# Patient Record
Sex: FEMALE | Race: WHITE | NOT HISPANIC OR LATINO | Employment: OTHER | URBAN - METROPOLITAN AREA
[De-identification: names, ages, dates, MRNs, and addresses within clinical notes are randomized per-mention and may not be internally consistent; named-entity substitution may affect disease eponyms.]

---

## 2024-09-07 ENCOUNTER — APPOINTMENT (OUTPATIENT)
Facility: HOSPITAL | Age: 74
End: 2024-09-07
Payer: MEDICARE

## 2024-09-07 ENCOUNTER — HOSPITAL ENCOUNTER (EMERGENCY)
Facility: HOSPITAL | Age: 74
Discharge: HOME | End: 2024-09-07
Attending: EMERGENCY MEDICINE
Payer: MEDICARE

## 2024-09-07 VITALS
BODY MASS INDEX: 32.44 KG/M2 | OXYGEN SATURATION: 94 % | SYSTOLIC BLOOD PRESSURE: 159 MMHG | RESPIRATION RATE: 16 BRPM | DIASTOLIC BLOOD PRESSURE: 88 MMHG | HEART RATE: 74 BPM | WEIGHT: 190 LBS | TEMPERATURE: 98.4 F | HEIGHT: 64 IN

## 2024-09-07 DIAGNOSIS — R10.32 LEFT LOWER QUADRANT ABDOMINAL PAIN: Primary | ICD-10-CM

## 2024-09-07 DIAGNOSIS — M48.00 SPINAL STENOSIS, UNSPECIFIED SPINAL REGION: ICD-10-CM

## 2024-09-07 LAB
ALBUMIN SERPL-MCNC: 3.9 G/DL (ref 3.5–5.2)
ALBUMIN/GLOB SERPL: 1.5 G/DL
ALP SERPL-CCNC: 64 U/L (ref 39–117)
ALT SERPL W P-5'-P-CCNC: 9 U/L (ref 1–33)
ANION GAP SERPL CALCULATED.3IONS-SCNC: 12.4 MMOL/L (ref 5–15)
AST SERPL-CCNC: 17 U/L (ref 1–32)
BACTERIA UR QL AUTO: ABNORMAL /HPF
BASOPHILS # BLD AUTO: 0.02 10*3/MM3 (ref 0–0.2)
BASOPHILS NFR BLD AUTO: 0.3 % (ref 0–1.5)
BILIRUB SERPL-MCNC: 0.5 MG/DL (ref 0–1.2)
BILIRUB UR QL STRIP: NEGATIVE
BUN SERPL-MCNC: 14 MG/DL (ref 8–23)
BUN/CREAT SERPL: 20 (ref 7–25)
CALCIUM SPEC-SCNC: 9 MG/DL (ref 8.6–10.5)
CHLORIDE SERPL-SCNC: 103 MMOL/L (ref 98–107)
CLARITY UR: CLEAR
CO2 SERPL-SCNC: 23.6 MMOL/L (ref 22–29)
COLOR UR: YELLOW
CREAT SERPL-MCNC: 0.7 MG/DL (ref 0.57–1)
D-LACTATE SERPL-SCNC: 1.4 MMOL/L (ref 0.5–2)
DEPRECATED RDW RBC AUTO: 42.2 FL (ref 37–54)
EGFRCR SERPLBLD CKD-EPI 2021: 90.9 ML/MIN/1.73
EOSINOPHIL # BLD AUTO: 0.09 10*3/MM3 (ref 0–0.4)
EOSINOPHIL NFR BLD AUTO: 1.5 % (ref 0.3–6.2)
ERYTHROCYTE [DISTWIDTH] IN BLOOD BY AUTOMATED COUNT: 12.7 % (ref 12.3–15.4)
GLOBULIN UR ELPH-MCNC: 2.6 GM/DL
GLUCOSE SERPL-MCNC: 306 MG/DL (ref 65–99)
GLUCOSE UR STRIP-MCNC: ABNORMAL MG/DL
HCT VFR BLD AUTO: 44.5 % (ref 34–46.6)
HGB BLD-MCNC: 14.9 G/DL (ref 12–15.9)
HGB UR QL STRIP.AUTO: NEGATIVE
HOLD SPECIMEN: NORMAL
HYALINE CASTS UR QL AUTO: ABNORMAL /LPF
IMM GRANULOCYTES # BLD AUTO: 0.01 10*3/MM3 (ref 0–0.05)
IMM GRANULOCYTES NFR BLD AUTO: 0.2 % (ref 0–0.5)
KETONES UR QL STRIP: NEGATIVE
LEUKOCYTE ESTERASE UR QL STRIP.AUTO: ABNORMAL
LIPASE SERPL-CCNC: 57 U/L (ref 13–60)
LYMPHOCYTES # BLD AUTO: 1.63 10*3/MM3 (ref 0.7–3.1)
LYMPHOCYTES NFR BLD AUTO: 27.1 % (ref 19.6–45.3)
MCH RBC QN AUTO: 30 PG (ref 26.6–33)
MCHC RBC AUTO-ENTMCNC: 33.5 G/DL (ref 31.5–35.7)
MCV RBC AUTO: 89.5 FL (ref 79–97)
MONOCYTES # BLD AUTO: 0.32 10*3/MM3 (ref 0.1–0.9)
MONOCYTES NFR BLD AUTO: 5.3 % (ref 5–12)
NEUTROPHILS NFR BLD AUTO: 3.95 10*3/MM3 (ref 1.7–7)
NEUTROPHILS NFR BLD AUTO: 65.6 % (ref 42.7–76)
NITRITE UR QL STRIP: NEGATIVE
PH UR STRIP.AUTO: 6 [PH] (ref 5–8)
PLATELET # BLD AUTO: 311 10*3/MM3 (ref 140–450)
PMV BLD AUTO: 10.8 FL (ref 6–12)
POTASSIUM SERPL-SCNC: 3.8 MMOL/L (ref 3.5–5.2)
PROT SERPL-MCNC: 6.5 G/DL (ref 6–8.5)
PROT UR QL STRIP: NEGATIVE
RBC # BLD AUTO: 4.97 10*6/MM3 (ref 3.77–5.28)
RBC # UR STRIP: ABNORMAL /HPF
REF LAB TEST METHOD: ABNORMAL
SODIUM SERPL-SCNC: 139 MMOL/L (ref 136–145)
SP GR UR STRIP: 1.02 (ref 1–1.03)
SQUAMOUS #/AREA URNS HPF: ABNORMAL /HPF
UROBILINOGEN UR QL STRIP: ABNORMAL
WBC # UR STRIP: ABNORMAL /HPF
WBC NRBC COR # BLD AUTO: 6.02 10*3/MM3 (ref 3.4–10.8)
WHOLE BLOOD HOLD COAG: NORMAL
WHOLE BLOOD HOLD SPECIMEN: NORMAL
YEAST URNS QL MICRO: ABNORMAL /HPF

## 2024-09-07 PROCEDURE — 81015 MICROSCOPIC EXAM OF URINE: CPT | Performed by: EMERGENCY MEDICINE

## 2024-09-07 PROCEDURE — 85025 COMPLETE CBC W/AUTO DIFF WBC: CPT | Performed by: EMERGENCY MEDICINE

## 2024-09-07 PROCEDURE — 99285 EMERGENCY DEPT VISIT HI MDM: CPT

## 2024-09-07 PROCEDURE — 83605 ASSAY OF LACTIC ACID: CPT | Performed by: EMERGENCY MEDICINE

## 2024-09-07 PROCEDURE — 96374 THER/PROPH/DIAG INJ IV PUSH: CPT

## 2024-09-07 PROCEDURE — 25010000002 MORPHINE PER 10 MG: Performed by: EMERGENCY MEDICINE

## 2024-09-07 PROCEDURE — 80053 COMPREHEN METABOLIC PANEL: CPT | Performed by: EMERGENCY MEDICINE

## 2024-09-07 PROCEDURE — 25510000001 IOPAMIDOL 61 % SOLUTION: Performed by: EMERGENCY MEDICINE

## 2024-09-07 PROCEDURE — 74177 CT ABD & PELVIS W/CONTRAST: CPT

## 2024-09-07 PROCEDURE — 81003 URINALYSIS AUTO W/O SCOPE: CPT | Performed by: EMERGENCY MEDICINE

## 2024-09-07 PROCEDURE — 83690 ASSAY OF LIPASE: CPT | Performed by: EMERGENCY MEDICINE

## 2024-09-07 RX ORDER — IOPAMIDOL 612 MG/ML
100 INJECTION, SOLUTION INTRAVASCULAR
Status: COMPLETED | OUTPATIENT
Start: 2024-09-07 | End: 2024-09-07

## 2024-09-07 RX ORDER — SODIUM CHLORIDE 9 MG/ML
10 INJECTION, SOLUTION INTRAMUSCULAR; INTRAVENOUS; SUBCUTANEOUS AS NEEDED
Status: DISCONTINUED | OUTPATIENT
Start: 2024-09-07 | End: 2024-09-07 | Stop reason: HOSPADM

## 2024-09-07 RX ADMIN — IOPAMIDOL 90 ML: 612 INJECTION, SOLUTION INTRAVENOUS at 14:16

## 2024-09-07 RX ADMIN — MORPHINE SULFATE 4 MG: 4 INJECTION, SOLUTION INTRAMUSCULAR; INTRAVENOUS at 16:12

## 2024-09-07 NOTE — FSED PROVIDER NOTE
"Subjective  History of Present Illness:    This is a 74-year-old female who presents with complaints of left lower quadrant abdominal pain that started yesterday.  Patient describes the pain as a burning sensation.  She has not had any diarrhea, has had normal bowel movements for her.  Denies any vomiting.  No fevers.  Typically does have constipation.  Does endorse abdominal bloating does not take any medications for her symptoms.  Past abdominal surgical history includes hysterectomy and ovarian cyst rupture.  Has had colonoscopy in the past with polyp removal.      Nurses Notes reviewed and agree, including vitals, allergies, social history and prior medical history.     REVIEW OF SYSTEMS: All systems reviewed and not pertinent unless noted.  Review of Systems    Past Medical History:   Diagnosis Date    Arthritis     Diabetes mellitus     Spinal stenosis        Allergies:    Patient has no known allergies.      Past Surgical History:   Procedure Laterality Date    ANKLE ARTHROPLASTY      BACK SURGERY      HYSTERECTOMY      SPINE SURGERY      TONSILLECTOMY           Social History     Socioeconomic History    Marital status:          History reviewed. No pertinent family history.    Objective  Physical Exam:  BP (!) 190/84   Pulse 77   Temp 98.4 °F (36.9 °C) (Oral)   Resp 16   Ht 162.6 cm (64\")   Wt 86.2 kg (190 lb)   SpO2 100%   BMI 32.61 kg/m²      Physical Exam  Vitals and nursing note reviewed.   Constitutional:       Appearance: She is well-developed.   Cardiovascular:      Rate and Rhythm: Normal rate and regular rhythm.   Pulmonary:      Effort: Pulmonary effort is normal.      Breath sounds: Normal breath sounds.   Abdominal:      General: Abdomen is flat.      Palpations: Abdomen is soft.      Tenderness: There is abdominal tenderness in the left lower quadrant.   Skin:     General: Skin is warm and dry.   Neurological:      Mental Status: She is alert.         Procedures    ED Course:     "     Lab Results (last 24 hours)       Procedure Component Value Units Date/Time    CBC & Differential [[id number]]  (Normal) Collected: 09/07/24 1220    Specimen: Blood Updated: 09/07/24 1257    Narrative:      The following orders were created for panel order CBC & Differential.  Procedure                               Abnormality         Status                     ---------                               -----------         ------                     CBC Auto Differential[[id number]]        Normal              Final result                 Please view results for these tests on the individual orders.    Comprehensive Metabolic Panel [[id number]]  (Abnormal) Collected: 09/07/24 1220    Specimen: Blood Updated: 09/07/24 1317     Glucose 306 mg/dL      BUN 14 mg/dL      Creatinine 0.70 mg/dL      Sodium 139 mmol/L      Potassium 3.8 mmol/L      Chloride 103 mmol/L      CO2 23.6 mmol/L      Calcium 9.0 mg/dL      Total Protein 6.5 g/dL      Albumin 3.9 g/dL      ALT (SGPT) 9 U/L      AST (SGOT) 17 U/L      Alkaline Phosphatase 64 U/L      Total Bilirubin 0.5 mg/dL      Globulin 2.6 gm/dL      A/G Ratio 1.5 g/dL      BUN/Creatinine Ratio 20.0     Anion Gap 12.4 mmol/L      eGFR 90.9 mL/min/1.73     Narrative:      GFR Normal >60  Chronic Kidney Disease <60  Kidney Failure <15    The GFR formula is only valid for adults with stable renal function between ages 18 and 70.    Lipase [[id number]]  (Normal) Collected: 09/07/24 1220    Specimen: Blood Updated: 09/07/24 1316     Lipase 57 U/L     Lactic Acid, Plasma [[id number]]  (Normal) Collected: 09/07/24 1220    Specimen: Blood Updated: 09/07/24 1315     Lactate 1.4 mmol/L     CBC Auto Differential [[id number]]  (Normal) Collected: 09/07/24 1220    Specimen: Blood Updated: 09/07/24 1257     WBC 6.02 10*3/mm3      RBC 4.97 10*6/mm3      Hemoglobin 14.9 g/dL      Hematocrit 44.5 %      MCV 89.5 fL      MCH 30.0 pg      MCHC 33.5 g/dL      RDW 12.7 %      RDW-SD 42.2 fl      MPV 10.8  fL      Platelets 311 10*3/mm3      Neutrophil % 65.6 %      Lymphocyte % 27.1 %      Monocyte % 5.3 %      Eosinophil % 1.5 %      Basophil % 0.3 %      Immature Grans % 0.2 %      Neutrophils, Absolute 3.95 10*3/mm3      Lymphocytes, Absolute 1.63 10*3/mm3      Monocytes, Absolute 0.32 10*3/mm3      Eosinophils, Absolute 0.09 10*3/mm3      Basophils, Absolute 0.02 10*3/mm3      Immature Grans, Absolute 0.01 10*3/mm3     Urinalysis With Microscopic If Indicated (No Culture) - Urine, Clean Catch [[id number]]  (Abnormal) Collected: 09/07/24 1241    Specimen: Urine, Clean Catch Updated: 09/07/24 1257     Color, UA Yellow     Appearance, UA Clear     pH, UA 6.0     Specific Gravity, UA 1.025     Glucose,  mg/dL (2+)     Ketones, UA Negative     Bilirubin, UA Negative     Blood, UA Negative     Protein, UA Negative     Leuk Esterase, UA Small (1+)     Nitrite, UA Negative     Urobilinogen, UA 1.0 E.U./dL    Urinalysis, Microscopic Only - Urine, Clean Catch [[id number]]  (Abnormal) Collected: 09/07/24 1558    Specimen: Urine, Clean Catch Updated: 09/07/24 1610     RBC, UA 0-2 /HPF      WBC, UA 3-5 /HPF      Bacteria, UA None Seen /HPF      Squamous Epithelial Cells, UA 0-2 /HPF      Yeast, UA Small/1+ Budding Yeast /HPF      Hyaline Casts, UA None Seen /LPF      Methodology Manual Light Microscopy             CT Abdomen Pelvis With Contrast    Result Date: 9/7/2024  CT ABDOMEN PELVIS W CONTRAST Date of Exam: 9/7/2024 2:08 PM EDT Indication: LLQ abdominal pain. Comparison: None available. Technique: Axial CT images were obtained of the abdomen and pelvis following the uneventful intravenous administration of 90 cc Isovue-300 IV contrast . Reconstructed coronal and sagittal images were also obtained. Automated exposure control and iterative construction methods were used. FINDINGS: Lung bases: Continued elevation of the left hemidiaphragm. Liver:No masses. No intrahepatic biliary ductal dilatation. Spleen:No masses. No  perisplenic hematoma. Pancreas:No pancreatic masses. No evidence of pancreatitis. Gallbladder and common bile duct:No evidence of cholelithiasis. No evidence of cholecystitis. Adrenal glands:No adrenal masses Kidneys and ureters: Hypodensities involving both kidneys statistically likely represent renal cysts. These measure up to 1.8 cm on the right and 0.8 cm on the left. No calculi present within the ureters. Normal caliber ureters. Urinary bladder:No urinary bladder wall thickening. No bladder masses. Small bowel:Normal caliber small bowel. Large bowel:No diverticulosis or diverticulitis. No large bowel masses are appreciated Appendix: Prior appendectomy GENITOURINARY: Prior hysterectomy. Ascites or pneumoperitoneum:None. Adenopathy:None present Osseous structures: The proximal femurs are intact. Degenerative changes of the hips and lumbar spine with prior lumbar spine fusion. Old healed right rib fractures. Remote unhealed fracture of the left 11th and 10th ribs. Other findings: None     Impression: No acute abdominal or pelvic pathology. Electronically Signed: Zhang Lackey MD  9/7/2024 2:29 PM EDT  Workstation ID: [id number]        [facility]     Amount and/or Complexity of Data Reviewed  Clinical lab tests: reviewed  Tests in the radiology section of CPT®: reviewed  Tests in the medicine section of CPT®: reviewed        Initial impression of presenting illness: Patient presents with complaints of left lower quadrant abdominal pain.  Workup did not reveal any acute findings.  White blood cell count was normal.  CT scan not really any acute findings.  Patient may have a small lipoma superficially.  Patient also has significant history of spinal stenosis and nerve damage.  She would like referrals to pain management and needs referrals for gastroenterology for colonoscopy.  Patient also would like a referral to primary care.  Discussed return precautions.  Discussed findings and plan of care with the patient who is  agreeable to discharge.    DDX: includes but is not limited to: Diverticulitis, gastroenteritis, colitis, urinary tract infection      Medications   Sodium Chloride (PF) 0.9 % 10 mL (has no administration in time range)   iopamidol (ISOVUE-300) 61 % injection 100 mL (90 mL Intravenous Given 9/7/24 1416)   morphine injection 4 mg (4 mg Intravenous Given 9/7/24 1612)           Data interpreted: Nursing notes reviewed, vital signs reviewed.  Labs independently interpreted by me (CBC, CMP, lipase, UA, troponin, ABG, lactic acid, procalcitonin).  Imaging independently interpreted by me (x-ray, CT scan).  EKG independently interpreted by me.  O2 saturation:    Counseling: Discussed the results above with the patient regarding need for admission or discharge.  Patient understands and agrees plan of care.      -----  ED Disposition       ED Disposition   Discharge    Condition   Stable    Comment   --             Final diagnoses:   Left lower quadrant abdominal pain   Spinal stenosis, unspecified spinal region      Your Follow-Up Providers       Go to  [facility] EMERGENCY DEPARTMENT [place].    Specialty: Emergency Medicine  Follow up details: As needed, If symptoms worsen  [address]  [facility] [address] [phone]             Schedule an appointment as soon as possible for a visit  with Da Gutierrez MD.    Specialty: Family Medicine  [address] [facility] [address]  [place] [address]  [phone]               [facility] .    Specialty: Pain Medicine  [address]  [facility] [address] [phone]  Additional information:  Phone Number: [phone]                     Contact information for after-discharge care    Follow-up information has not been specified.                    Your medication list        START taking these medications        Instructions Last Dose Given Next Dose Due   naproxen 375 MG  tablet  Commonly known as: NAPROSYN      Take 1 tablet by mouth 2 (Two) Times a Day As Needed for Mild Pain.                 Where to Get Your Medications        These medications were sent to [facility] [id number] - [place], [place] - [address] AT [facility] - [phone]  - [phone] FX  [address] [facility] [address]      Phone: [phone]   naproxen 375 MG tablet

## 2024-09-10 ENCOUNTER — OFFICE VISIT (OUTPATIENT)
Dept: FAMILY MEDICINE CLINIC | Facility: CLINIC | Age: 74
End: 2024-09-10
Payer: MEDICARE

## 2024-09-10 VITALS
HEART RATE: 79 BPM | RESPIRATION RATE: 16 BRPM | TEMPERATURE: 97.5 F | HEIGHT: 64 IN | WEIGHT: 189.2 LBS | BODY MASS INDEX: 32.3 KG/M2 | DIASTOLIC BLOOD PRESSURE: 70 MMHG | OXYGEN SATURATION: 97 % | SYSTOLIC BLOOD PRESSURE: 138 MMHG

## 2024-09-10 DIAGNOSIS — E11.65 TYPE 2 DIABETES MELLITUS WITH HYPERGLYCEMIA, WITH LONG-TERM CURRENT USE OF INSULIN: Primary | ICD-10-CM

## 2024-09-10 DIAGNOSIS — R10.32 LLQ ABDOMINAL PAIN: ICD-10-CM

## 2024-09-10 DIAGNOSIS — Z79.4 TYPE 2 DIABETES MELLITUS WITH HYPERGLYCEMIA, WITH LONG-TERM CURRENT USE OF INSULIN: Primary | ICD-10-CM

## 2024-09-10 LAB
EXPIRATION DATE: NORMAL
Lab: NORMAL
POC CREATININE URINE: NORMAL
POC MICROALBUMIN URINE: NORMAL

## 2024-09-10 PROCEDURE — 3046F HEMOGLOBIN A1C LEVEL >9.0%: CPT | Performed by: NURSE PRACTITIONER

## 2024-09-10 PROCEDURE — 82044 UR ALBUMIN SEMIQUANTITATIVE: CPT | Performed by: NURSE PRACTITIONER

## 2024-09-10 PROCEDURE — 99204 OFFICE O/P NEW MOD 45 MIN: CPT | Performed by: NURSE PRACTITIONER

## 2024-09-10 PROCEDURE — 1160F RVW MEDS BY RX/DR IN RCRD: CPT | Performed by: NURSE PRACTITIONER

## 2024-09-10 PROCEDURE — 1159F MED LIST DOCD IN RCRD: CPT | Performed by: NURSE PRACTITIONER

## 2024-09-10 RX ORDER — SEMAGLUTIDE 1.34 MG/ML
1 INJECTION, SOLUTION SUBCUTANEOUS WEEKLY
COMMUNITY
Start: 2024-07-02 | End: 2024-09-18

## 2024-09-11 LAB
ALBUMIN SERPL-MCNC: 3.9 G/DL (ref 3.8–4.8)
ALP SERPL-CCNC: 65 IU/L (ref 44–121)
ALT SERPL-CCNC: 13 IU/L (ref 0–32)
AST SERPL-CCNC: 15 IU/L (ref 0–40)
BASOPHILS # BLD AUTO: 0 X10E3/UL (ref 0–0.2)
BASOPHILS NFR BLD AUTO: 0 %
BILIRUB SERPL-MCNC: 0.4 MG/DL (ref 0–1.2)
BUN SERPL-MCNC: 19 MG/DL (ref 8–27)
BUN/CREAT SERPL: 20 (ref 12–28)
CALCIUM SERPL-MCNC: 9.2 MG/DL (ref 8.7–10.3)
CHLORIDE SERPL-SCNC: 102 MMOL/L (ref 96–106)
CO2 SERPL-SCNC: 22 MMOL/L (ref 20–29)
CREAT SERPL-MCNC: 0.95 MG/DL (ref 0.57–1)
EGFRCR SERPLBLD CKD-EPI 2021: 63 ML/MIN/1.73
EOSINOPHIL # BLD AUTO: 0.1 X10E3/UL (ref 0–0.4)
EOSINOPHIL NFR BLD AUTO: 2 %
ERYTHROCYTE [DISTWIDTH] IN BLOOD BY AUTOMATED COUNT: 11.9 % (ref 11.7–15.4)
GLOBULIN SER CALC-MCNC: 2.4 G/DL (ref 1.5–4.5)
GLUCOSE SERPL-MCNC: 304 MG/DL (ref 70–99)
HBA1C MFR BLD: 10.1 % (ref 4.8–5.6)
HCT VFR BLD AUTO: 44.4 % (ref 34–46.6)
HGB BLD-MCNC: 14.4 G/DL (ref 11.1–15.9)
IMM GRANULOCYTES # BLD AUTO: 0 X10E3/UL (ref 0–0.1)
IMM GRANULOCYTES NFR BLD AUTO: 0 %
LYMPHOCYTES # BLD AUTO: 1.5 X10E3/UL (ref 0.7–3.1)
LYMPHOCYTES NFR BLD AUTO: 21 %
MCH RBC QN AUTO: 30.8 PG (ref 26.6–33)
MCHC RBC AUTO-ENTMCNC: 32.4 G/DL (ref 31.5–35.7)
MCV RBC AUTO: 95 FL (ref 79–97)
MONOCYTES # BLD AUTO: 0.4 X10E3/UL (ref 0.1–0.9)
MONOCYTES NFR BLD AUTO: 5 %
NEUTROPHILS # BLD AUTO: 5.2 X10E3/UL (ref 1.4–7)
NEUTROPHILS NFR BLD AUTO: 72 %
PLATELET # BLD AUTO: 324 X10E3/UL (ref 150–450)
POTASSIUM SERPL-SCNC: 4.3 MMOL/L (ref 3.5–5.2)
PROT SERPL-MCNC: 6.3 G/DL (ref 6–8.5)
RBC # BLD AUTO: 4.68 X10E6/UL (ref 3.77–5.28)
SODIUM SERPL-SCNC: 137 MMOL/L (ref 134–144)
WBC # BLD AUTO: 7.3 X10E3/UL (ref 3.4–10.8)

## 2024-09-17 ENCOUNTER — HOSPITAL ENCOUNTER (OUTPATIENT)
Dept: ULTRASOUND IMAGING | Facility: HOSPITAL | Age: 74
Discharge: HOME | End: 2024-09-17
Admitting: NURSE PRACTITIONER
Payer: MEDICARE

## 2024-09-17 DIAGNOSIS — R10.32 LLQ ABDOMINAL PAIN: ICD-10-CM

## 2024-09-17 PROCEDURE — 76700 US EXAM ABDOM COMPLETE: CPT

## 2024-09-18 ENCOUNTER — OFFICE VISIT (OUTPATIENT)
Dept: FAMILY MEDICINE CLINIC | Facility: CLINIC | Age: 74
End: 2024-09-18
Payer: MEDICARE

## 2024-09-18 VITALS
HEART RATE: 80 BPM | DIASTOLIC BLOOD PRESSURE: 78 MMHG | HEIGHT: 64 IN | TEMPERATURE: 97.5 F | RESPIRATION RATE: 16 BRPM | SYSTOLIC BLOOD PRESSURE: 138 MMHG | OXYGEN SATURATION: 96 % | WEIGHT: 188.4 LBS | BODY MASS INDEX: 32.17 KG/M2

## 2024-09-18 DIAGNOSIS — E11.65 TYPE 2 DIABETES MELLITUS WITH HYPERGLYCEMIA, WITH LONG-TERM CURRENT USE OF INSULIN: ICD-10-CM

## 2024-09-18 DIAGNOSIS — Z79.4 TYPE 2 DIABETES MELLITUS WITH HYPERGLYCEMIA, WITH LONG-TERM CURRENT USE OF INSULIN: ICD-10-CM

## 2024-09-18 DIAGNOSIS — K80.20 CALCULUS OF GALLBLADDER WITHOUT CHOLECYSTITIS WITHOUT OBSTRUCTION: Primary | ICD-10-CM

## 2024-09-18 PROCEDURE — 99204 OFFICE O/P NEW MOD 45 MIN: CPT | Performed by: NURSE PRACTITIONER

## 2024-09-18 PROCEDURE — 3046F HEMOGLOBIN A1C LEVEL >9.0%: CPT | Performed by: NURSE PRACTITIONER

## 2024-09-18 PROCEDURE — 1159F MED LIST DOCD IN RCRD: CPT | Performed by: NURSE PRACTITIONER

## 2024-09-18 PROCEDURE — 1160F RVW MEDS BY RX/DR IN RCRD: CPT | Performed by: NURSE PRACTITIONER

## 2024-09-18 RX ORDER — SEMAGLUTIDE 0.68 MG/ML
0.25 INJECTION, SOLUTION SUBCUTANEOUS WEEKLY
Qty: 3 ML | Refills: 0 | COMMUNITY
Start: 2024-09-18

## 2024-09-22 ENCOUNTER — PATIENT ROUNDING (BHMG ONLY) (OUTPATIENT)
Dept: FAMILY MEDICINE CLINIC | Facility: CLINIC | Age: 74
End: 2024-09-22
Payer: MEDICARE

## 2024-10-02 ENCOUNTER — OFFICE VISIT (OUTPATIENT)
Dept: PAIN MEDICINE | Facility: CLINIC | Age: 74
End: 2024-10-02
Payer: MEDICARE

## 2024-10-02 VITALS — HEIGHT: 64 IN | BODY MASS INDEX: 30.05 KG/M2 | WEIGHT: 176 LBS

## 2024-10-02 DIAGNOSIS — M96.1 POSTLAMINECTOMY SYNDROME: Primary | ICD-10-CM

## 2024-10-02 PROCEDURE — 1125F AMNT PAIN NOTED PAIN PRSNT: CPT | Performed by: STUDENT IN AN ORGANIZED HEALTH CARE EDUCATION/TRAINING PROGRAM

## 2024-10-02 PROCEDURE — 99204 OFFICE O/P NEW MOD 45 MIN: CPT | Performed by: STUDENT IN AN ORGANIZED HEALTH CARE EDUCATION/TRAINING PROGRAM

## 2024-10-02 PROCEDURE — 1160F RVW MEDS BY RX/DR IN RCRD: CPT | Performed by: STUDENT IN AN ORGANIZED HEALTH CARE EDUCATION/TRAINING PROGRAM

## 2024-10-02 PROCEDURE — 1159F MED LIST DOCD IN RCRD: CPT | Performed by: STUDENT IN AN ORGANIZED HEALTH CARE EDUCATION/TRAINING PROGRAM

## 2024-10-02 PROCEDURE — G2211 COMPLEX E/M VISIT ADD ON: HCPCS | Performed by: STUDENT IN AN ORGANIZED HEALTH CARE EDUCATION/TRAINING PROGRAM

## 2024-10-02 RX ORDER — GABAPENTIN 100 MG/1
100 CAPSULE ORAL 2 TIMES DAILY
Qty: 60 CAPSULE | Refills: 0 | Status: SHIPPED | OUTPATIENT
Start: 2024-10-02

## 2024-10-02 NOTE — PROGRESS NOTES
Referring Physician: Jessi Urrutia PA-C  [address] [facility] [address]  [place] [address]    Primary Physician: Sonia Flynn APRN    CHIEF COMPLAINT or REASON FOR VISIT: Back Pain (New patient)      Initial history of present illness on 10/02/2024:  Ms. Gera Dominguez is 74 y.o. female who presents as a new patient referral for evaluation treatment of chronic low back pain with radiation to left lower extremity.  She describes numbness burning tingling pain radiating into her left leg and foot.  She has a prior history of cervical lumbar spinal fusion with Dr. Malloy, neurosurgery.  Subsequent to the surgery she continues to have numbness and burning radiating to her left leg.  She did try gabapentin 100 mg which was helpful however she was concerned about needing to continue to take medicine to treat her condition.  She would like to avoid medications.  She has previously seen Dr. Clemente at the [facility] with pain management.  She has tried injection therapy with modest benefit.  Dr. Clemente had discussed spinal cord stimulator trial and prepared accordingly with thoracic and lumbar MRI.  Unfortunately her blood glucose is poorly controlled with most recent A1c of 10.1%.  She is now moving all of her care to [facility].    Interval history:    Interventions:      Objective Pain Scoring:   BRIEF PAIN INVENTORY:  Total score:   Pain Score    10/02/24 1250   PainSc:   8   PainLoc: Leg      PHQ-2: PHQ-2 Total Score: 0  PHQ-9: PHQ-9: Brief Depression Severity Measure Score: 0  Opioid Risk Tool:         Review of Systems:   ROS negative except as otherwise noted     Past Medical History:   Past Medical History:   Diagnosis Date    Arthritis     Diabetes mellitus     Spinal stenosis          Past Surgical History:   Past Surgical History:   Procedure Laterality Date    ANKLE ARTHROPLASTY      BACK SURGERY      HYSTERECTOMY      SPINE SURGERY      TONSILLECTOMY           Family History   History  "reviewed. No pertinent family history.      Social History   Social History     Socioeconomic History    Marital status:    Tobacco Use    Smoking status: Never     Passive exposure: Never    Smokeless tobacco: Never   Vaping Use    Vaping status: Never Used   Substance and Sexual Activity    Alcohol use: Not Currently    Drug use: Never    Sexual activity: Defer        Medications:     Current Outpatient Medications:     insulin degludec (TRESIBA FLEXTOUCH) 100 UNIT/ML solution pen-injector injection, Inject 24 Units under the skin into the appropriate area as directed Daily., Disp: , Rfl:     naproxen (NAPROSYN) 375 MG tablet, Take 1 tablet by mouth 2 (Two) Times a Day As Needed for Mild Pain., Disp: 30 tablet, Rfl: 0    Semaglutide,0.25 or 0.5MG/DOS, (Ozempic, 0.25 or 0.5 MG/DOSE,) 2 MG/3ML solution pen-injector, Inject 0.25 mg under the skin into the appropriate area as directed 1 (One) Time Per Week., Disp: 3 mL, Rfl: 0    gabapentin (Neurontin) 100 MG capsule, Take 1 capsule by mouth 2 (Two) Times a Day., Disp: 60 capsule, Rfl: 0        Physical Exam:     Vitals:    10/02/24 1250   Weight: 79.8 kg (176 lb)   Height: 162.6 cm (64\")   PainSc:   8   PainLoc: Leg        General: Alert and oriented, No acute distress.   HEENT: Normocephalic, atraumatic.   Cardiovascular: No gross edema  Respiratory: Respirations are non-labored       Lumbar Spine:   No masses or atrophy  Range of motion - Flexion normal. Extension normal.    Facet Loading: Negative bilaterally  Facet Palpation - Nontender   Asif finger/Gaenslen's/Amari's/ESTEFANIA/Thigh thrust -   Straight leg raise/slump test: Negative bilaterally  Multifidus toe-touch test:    Motor Exam:       Strength: Rate on 1-5 scale Right Left    L1/2- hip flexion 5/5  5/5    L3- knee extension 5/5  5/5    L4- ankle dorsiflexion 5/5  5/5    L5- great toe extension 5/5  5/5    S1- ankle plantarflexion 5/5  5/5    Sensory Exam: Burning paresthesia left leg   "     Neurologic: Cranial Nerves II-XII are grossly intact.      Psychiatric: Cooperative.   Gait: Antalgic  Assistive Devices: None    Imaging Studies:   No results found for this or any previous visit.        Independent review of radiographic imaging:     Impression & Plan:       10/02/2024: Gera Dominguez is a 74 y.o. female with past medical history significant for L2-4 laminectomies, L2-5 PLIF, possible cervical discectomy, DM who presents to the pain clinic for evaluation and treatment of chronic low back radiation left lower extremity.  Evaluation consistent with postlaminectomy syndrome.  I will refer her to endocrinology for better control of her glucose.  She additionally will start gabapentin.  She may be a candidate for spinal cord stimulator trial once her HbA1c is consistently at least below 10% preferably below 8%.    1. Postlaminectomy syndrome        PLAN:  1. Medication Recommendations: Recommend Voltaren topical, NSAIDs, Tylenol.  Can trial turmeric 500 mg twice daily if NSAID contraindicated.  -Start gabapentin 100 mg twice daily 60 pills no refills  -As part of this patient's treatment plan, patient will be prescribed controlled substances. The patient has been made aware of appropriate use of such medications, including potential risk of somnolence, limited ability to drive and /or work safely, and potential for dependence or overdose. It has also been made clear that these medications are for use by this patient only, without concomitant use of alcohol or other substances unless prescribed.Controlled substance status of medication discussed with patient, discussed risks of medication including abuse potential and diversion potential and need to follow up for reevaluation appointment in order to receive further refills.  Tyler was reviewed and compliant.    2. Physical Therapy: Continue HEP    3. Psychological: defer    4. Complementary and alternative (CAM) Therapies:     5. Labs/Diagnostic  studies: None indicated     6. Imaging: Thoracic and lumbar MRIs reviewed    7. Interventions: Consider SCS trial after A1c consistently below 10% preferably below 8%    8. Referrals: Dr. Carl Rosenstein endocrinology    9. Records: Outside pain management, neurosurgery, PCP notes reviewed    10. Lifestyle goals:    Follow-up 1 month      [facility]  Bobby Quinones MD          Quality Metrics:

## 2024-10-03 DIAGNOSIS — Z79.4 TYPE 2 DIABETES MELLITUS WITH HYPERGLYCEMIA, WITH LONG-TERM CURRENT USE OF INSULIN: Primary | ICD-10-CM

## 2024-10-03 DIAGNOSIS — E11.65 TYPE 2 DIABETES MELLITUS WITH HYPERGLYCEMIA, WITH LONG-TERM CURRENT USE OF INSULIN: Primary | ICD-10-CM

## 2024-10-23 ENCOUNTER — TELEPHONE (OUTPATIENT)
Dept: FAMILY MEDICINE CLINIC | Facility: CLINIC | Age: 74
End: 2024-10-23
Payer: MEDICARE

## 2024-10-23 NOTE — TELEPHONE ENCOUNTER
Caller: Gera Dominguez    Relationship: Self    Best call back number:[phone]    What is the best time to reach you: ANYTIME     Who are you requesting to speak with (clinical staff, provider,  specific staff member): CLINICAL STAFF      What was the call regarding: THE PATIENT WOULD LIKE TO KNOW IF THE OFFICE HAS THE MODERNA RSV VACCINE AND IF SHE CAN GET THE VACCINE

## 2024-10-24 ENCOUNTER — FLU SHOT (OUTPATIENT)
Dept: FAMILY MEDICINE CLINIC | Facility: CLINIC | Age: 74
End: 2024-10-24
Payer: MEDICARE

## 2024-10-24 DIAGNOSIS — Z23 NEED FOR INFLUENZA VACCINATION: Primary | ICD-10-CM

## 2024-10-24 PROCEDURE — G0008 ADMIN INFLUENZA VIRUS VAC: HCPCS | Performed by: NURSE PRACTITIONER

## 2024-10-24 PROCEDURE — 90662 IIV NO PRSV INCREASED AG IM: CPT | Performed by: NURSE PRACTITIONER

## 2024-10-28 ENCOUNTER — TELEPHONE (OUTPATIENT)
Dept: FAMILY MEDICINE CLINIC | Facility: CLINIC | Age: 74
End: 2024-10-28
Payer: MEDICARE

## 2024-10-28 DIAGNOSIS — Z91.89 AT HIGH RISK FOR RESPIRATORY INFECTION: Primary | ICD-10-CM

## 2024-10-28 NOTE — TELEPHONE ENCOUNTER
Pt educated that it's not that we refuse it, we don't carry it. She understood. Rx faxed to [facility] at pts request.

## 2024-10-28 NOTE — TELEPHONE ENCOUNTER
Caller: Gera Dominguez    Relationship: Self    Best call back number: [phone]     What was the call regarding: PATIENT WOULD LIKE TO DISCUSS GETTING THE RSV VACCINE. SHE'S AWARE THAT OUR OFFICE REFUSES TO DO IT. HOWEVER, HER PHARMACY WILL GIVE HER THE SHOT WITH A PRESCRIPTION. PLEASE FOLLOW UP.    Is it okay if the provider responds through MyChart: NO

## 2024-10-28 NOTE — TELEPHONE ENCOUNTER
I have sent order to the pharmacy for patient.    Please let patient know that we do not refuse to do the vaccine, we just do not carry it in the office.    Thanks

## 2024-10-29 ENCOUNTER — TELEPHONE (OUTPATIENT)
Dept: FAMILY MEDICINE CLINIC | Facility: CLINIC | Age: 74
End: 2024-10-29
Payer: MEDICARE

## 2024-10-29 DIAGNOSIS — Z91.89 AT HIGH RISK FOR RESPIRATORY INFECTION: Primary | ICD-10-CM

## 2024-10-29 RX ORDER — RESPIRATORY SYNCYTIAL VIRUS VACCINE 50 UG/.5ML
1 SUSPENSION INTRAMUSCULAR ONCE
Qty: 0.5 ML | Refills: 0 | Status: SHIPPED | OUTPATIENT
Start: 2024-10-29 | End: 2024-10-29

## 2024-10-29 NOTE — TELEPHONE ENCOUNTER
Caller: Gera Dominguez    Relationship: Self    Best call back number: [phone]     What was the call regarding: PATIENT STATES THE WRONG RSV VACCINE WAS CALLED IN FOR HER PREVIOUSLY AND WOULD LIKE TO HAVE THE MRESVIA  RSV VACCINE [id number] - [place], [place] - [address] [place] [address] AT [facility] [address] - [phone] [facility] [phone] FX

## 2024-10-30 ENCOUNTER — OFFICE VISIT (OUTPATIENT)
Dept: PAIN MEDICINE | Facility: CLINIC | Age: 74
End: 2024-10-30
Payer: MEDICARE

## 2024-10-30 VITALS — WEIGHT: 190 LBS | HEIGHT: 64 IN | BODY MASS INDEX: 32.44 KG/M2

## 2024-10-30 DIAGNOSIS — M96.1 POSTLAMINECTOMY SYNDROME: Primary | ICD-10-CM

## 2024-10-30 DIAGNOSIS — E11.65 TYPE 2 DIABETES MELLITUS WITH HYPERGLYCEMIA, WITH LONG-TERM CURRENT USE OF INSULIN: ICD-10-CM

## 2024-10-30 DIAGNOSIS — Z79.4 TYPE 2 DIABETES MELLITUS WITH HYPERGLYCEMIA, WITH LONG-TERM CURRENT USE OF INSULIN: ICD-10-CM

## 2024-10-30 RX ORDER — GABAPENTIN 300 MG/1
300 CAPSULE ORAL 2 TIMES DAILY
Qty: 60 CAPSULE | Refills: 1 | Status: SHIPPED | OUTPATIENT
Start: 2024-10-30

## 2024-10-30 NOTE — PROGRESS NOTES
Referring Physician: No referring provider defined for this encounter.    Primary Physician: Sonia Flynn APRN    CHIEF COMPLAINT or REASON FOR VISIT: Back Pain and Follow-up      Initial history of present illness on 10/02/2024:  Ms. Gera Dominguez is 74 y.o. female who presents as a new patient referral for evaluation treatment of chronic low back pain with radiation to left lower extremity.  She describes numbness burning tingling pain radiating into her left leg and foot.  She has a prior history of cervical lumbar spinal fusion with Dr. Malloy, neurosurgery.  Subsequent to the surgery she continues to have numbness and burning radiating to her left leg.  She did try gabapentin 100 mg which was helpful however she was concerned about needing to continue to take medicine to treat her condition.  She would like to avoid medications.  She has previously seen Dr. Clemente at the [facility] with pain management.  She has tried injection therapy with modest benefit.  Dr. Clemente had discussed spinal cord stimulator trial and prepared accordingly with thoracic and lumbar MRI.  Unfortunately her blood glucose is poorly controlled with most recent A1c of 10.1%.  She is now moving all of her care to [facility].    Interval history:  Patient returns to clinic today for medication management.  She has previously had discussions regarding a spinal cord stimulator trial however her HbA1c remains too high at the moment to pursue a trial.  She has a pending evaluation with endocrinology, Dr. Kyle Rosenstein, MD, in January 2025.   She continues to complain of chronic low back pain with radiation to the left lower extremity.  There is associated numbness burning tingling pain within her left leg and foot.  She denies any new injuries or events since her previous appointment.  She has been taking gabapentin 100 mg twice daily with some relief.  She is tolerating the medication without side effect.  She is interested  "in a spinal cord stimulator trial whenever her HbA1c is better controlled.    Interventions:      Objective Pain Scoring:   BRIEF PAIN INVENTORY:  Total score:   Pain Score    10/30/24 1357   PainSc:   7   PainLoc: Leg      PHQ-2:    PHQ-9:    Opioid Risk Tool:         Review of Systems:   ROS negative except as otherwise noted     Past Medical History:   Past Medical History:   Diagnosis Date    Arthritis     Diabetes mellitus     Spinal stenosis          Past Surgical History:   Past Surgical History:   Procedure Laterality Date    ANKLE ARTHROPLASTY      BACK SURGERY      HYSTERECTOMY      SPINE SURGERY      TONSILLECTOMY           Family History   History reviewed. No pertinent family history.      Social History   Social History     Socioeconomic History    Marital status:    Tobacco Use    Smoking status: Never     Passive exposure: Never    Smokeless tobacco: Never   Vaping Use    Vaping status: Never Used   Substance and Sexual Activity    Alcohol use: Not Currently    Drug use: Never    Sexual activity: Defer        Medications:     Current Outpatient Medications:     gabapentin (Neurontin) 100 MG capsule, Take 1 capsule by mouth 2 (Two) Times a Day., Disp: 60 capsule, Rfl: 0    insulin degludec (TRESIBA FLEXTOUCH) 100 UNIT/ML solution pen-injector injection, Inject 24 Units under the skin into the appropriate area as directed Daily., Disp: , Rfl:     Semaglutide,0.25 or 0.5MG/DOS, (Ozempic, 0.25 or 0.5 MG/DOSE,) 2 MG/3ML solution pen-injector, Inject 0.25 mg under the skin into the appropriate area as directed 1 (One) Time Per Week., Disp: 3 mL, Rfl: 0    naproxen (NAPROSYN) 375 MG tablet, Take 1 tablet by mouth 2 (Two) Times a Day As Needed for Mild Pain. (Patient not taking: Reported on 10/30/2024), Disp: 30 tablet, Rfl: 0        Physical Exam:     Vitals:    10/30/24 1357   Weight: 86.2 kg (190 lb)   Height: 162.6 cm (64\")   PainSc:   7   PainLoc: Leg        General: Alert and oriented, No acute " distress.   HEENT: Normocephalic, atraumatic.   Cardiovascular: No gross edema  Respiratory: Respirations are non-labored       Lumbar Spine:   No masses or atrophy  Range of motion - Flexion normal. Extension normal.    Facet Loading: Negative bilaterally  Facet Palpation - Nontender   Asif finger/Gaenslen's/Amari's/ESTEFANIA/Thigh thrust -   Straight leg raise/slump test: Negative bilaterally  Multifidus toe-touch test:    Motor Exam:       Strength: Rate on 1-5 scale Right Left    L1/2- hip flexion 5/5  5/5    L3- knee extension 5/5  5/5    L4- ankle dorsiflexion 5/5  5/5    L5- great toe extension 5/5  5/5    S1- ankle plantarflexion 5/5  5/5    Sensory Exam: Burning paresthesia left leg       Neurologic: Cranial Nerves II-XII are grossly intact.      Psychiatric: Cooperative.   Gait: Antalgic  Assistive Devices: None    Imaging Studies:   No results found for this or any previous visit.        Independent review of radiographic imaging:     Impression & Plan:       10/02/2024: Gera Dominguez is a 74 y.o. female with past medical history significant for L2-4 laminectomies, L2-5 PLIF, possible cervical discectomy, DM who presents to the pain clinic for evaluation and treatment of chronic low back radiation left lower extremity.  Evaluation consistent with postlaminectomy syndrome.  I will refer her to endocrinology for better control of her glucose.  She additionally will start gabapentin.  She may be a candidate for spinal cord stimulator trial once her HbA1c is consistently at least below 10% preferably below 8%.  10/30/2024: Uptitrate gabapentin.  Will pursue SCS trial once HbA1c is better controlled    1. Postlaminectomy syndrome    2. Type 2 diabetes mellitus with hyperglycemia, with long-term current use of insulin          PLAN:  1. Medication Recommendations: Recommend Voltaren topical, NSAIDs, Tylenol.  Can trial turmeric 500 mg twice daily if NSAID contraindicated.  -Uptitrate  gabapentin  -gabapentin 100 mg twice daily 60 pills #1 refill  -As part of this patient's treatment plan, patient will be prescribed controlled substances. The patient has been made aware of appropriate use of such medications, including potential risk of somnolence, limited ability to drive and /or work safely, and potential for dependence or overdose. It has also been made clear that these medications are for use by this patient only, without concomitant use of alcohol or other substances unless prescribed.Controlled substance status of medication discussed with patient, discussed risks of medication including abuse potential and diversion potential and need to follow up for reevaluation appointment in order to receive further refills.  Tyler was reviewed and compliant.    2. Physical Therapy: Continue HEP    3. Psychological: defer    4. Complementary and alternative (CAM) Therapies:     5. Labs/Diagnostic studies: Obtain compliance UDS; last HbA1c on 9/10/2024 was 10.1    6. Imaging: Thoracic and lumbar MRIs reviewed    7. Interventions: Consider SCS trial after A1c consistently below 10% preferably below 8%; [employer] device rep here for education    8. Referrals: Pending evaluation with endocrinology, Dr. Rosenstein    9. Records: Tyler reviewed and compliant    10. Lifestyle goals:    Follow-up 2 months for medication management    [facility]  Darwin Sanchez PA-C          Quality Metrics:

## 2024-10-30 NOTE — TELEPHONE ENCOUNTER
Uptitrate gabapentin  Gabapentin 300 mg twice daily, 60 pills, #1 refill  Pending UDS  Tyler reviewed and compliant  Controlled substance agreement signed in office  Appropriate follow-up visit scheduled

## 2024-10-31 DIAGNOSIS — Z51.81 ENCOUNTER FOR THERAPEUTIC DRUG LEVEL MONITORING: Primary | ICD-10-CM

## 2024-11-15 ENCOUNTER — OFFICE VISIT (OUTPATIENT)
Dept: FAMILY MEDICINE CLINIC | Facility: CLINIC | Age: 74
End: 2024-11-15
Payer: MEDICARE

## 2024-11-15 VITALS
HEART RATE: 83 BPM | DIASTOLIC BLOOD PRESSURE: 68 MMHG | SYSTOLIC BLOOD PRESSURE: 138 MMHG | TEMPERATURE: 97.5 F | HEIGHT: 64 IN | RESPIRATION RATE: 14 BRPM | BODY MASS INDEX: 33.57 KG/M2 | OXYGEN SATURATION: 99 % | WEIGHT: 196.6 LBS

## 2024-11-15 DIAGNOSIS — Z12.11 SCREENING FOR COLON CANCER: ICD-10-CM

## 2024-11-15 DIAGNOSIS — Z13.820 SCREENING FOR OSTEOPOROSIS: ICD-10-CM

## 2024-11-15 DIAGNOSIS — E11.65 TYPE 2 DIABETES MELLITUS WITH HYPERGLYCEMIA, WITH LONG-TERM CURRENT USE OF INSULIN: ICD-10-CM

## 2024-11-15 DIAGNOSIS — N95.9 UNSPECIFIED MENOPAUSAL AND PERIMENOPAUSAL DISORDER: ICD-10-CM

## 2024-11-15 DIAGNOSIS — M19.90 OSTEOARTHRITIS, UNSPECIFIED OSTEOARTHRITIS TYPE, UNSPECIFIED SITE: ICD-10-CM

## 2024-11-15 DIAGNOSIS — Z79.4 TYPE 2 DIABETES MELLITUS WITH HYPERGLYCEMIA, WITH LONG-TERM CURRENT USE OF INSULIN: ICD-10-CM

## 2024-11-15 DIAGNOSIS — Z00.00 MEDICARE ANNUAL WELLNESS VISIT, SUBSEQUENT: Primary | ICD-10-CM

## 2024-11-15 DIAGNOSIS — Z12.31 SCREENING MAMMOGRAM FOR BREAST CANCER: ICD-10-CM

## 2024-11-15 PROCEDURE — 99213 OFFICE O/P EST LOW 20 MIN: CPT | Performed by: NURSE PRACTITIONER

## 2024-11-15 PROCEDURE — G0439 PPPS, SUBSEQ VISIT: HCPCS | Performed by: NURSE PRACTITIONER

## 2024-11-15 PROCEDURE — 3046F HEMOGLOBIN A1C LEVEL >9.0%: CPT | Performed by: NURSE PRACTITIONER

## 2024-11-15 PROCEDURE — 1125F AMNT PAIN NOTED PAIN PRSNT: CPT | Performed by: NURSE PRACTITIONER

## 2024-11-15 RX ORDER — MELOXICAM 7.5 MG/1
7.5 TABLET ORAL DAILY
Qty: 30 TABLET | Refills: 1 | Status: SHIPPED | OUTPATIENT
Start: 2024-11-15

## 2024-11-15 NOTE — ASSESSMENT & PLAN NOTE
Diabetes Education  Goal A1C 7 or less  Check glucose at least 1x per day unless otherwise specified  Yearly eye exams  Check feet daily  Eat low carb diet, low sugar  Increase water intake  Exercise 30-45 mins, 3-4x a week  Take meds as prescribed      Orders:    Semaglutide,0.25 or 0.5MG/DOS, (OZEMPIC) 2 MG/3ML solution pen-injector; Inject 0.5 mg under the skin into the appropriate area as directed 1 (One) Time Per Week.

## 2024-11-15 NOTE — PROGRESS NOTES
Subjective   The ABCs of the Annual Wellness Visit  Medicare Wellness Visit      Gera Dominguez is a 74 y.o. patient who presents for a Medicare Wellness Visit.    The following portions of the patient's history were reviewed and   updated as appropriate: allergies, current medications, past family history, past medical history, past social history, past surgical history, and problem list.    Compared to one year ago, the patient's physical   health is better.  Compared to one year ago, the patient's mental   health is better.    Recent Hospitalizations:  She was not admitted to the hospital during the last year.     Current Medical Providers:  Patient Care Team:  Sonia Flynn APRN as PCP - General (Nurse Practitioner)    Outpatient Medications Prior to Visit   Medication Sig Dispense Refill    gabapentin (NEURONTIN) 300 MG capsule Take 1 capsule by mouth 2 (Two) Times a Day. 60 capsule 1    insulin degludec (TRESIBA FLEXTOUCH) 100 UNIT/ML solution pen-injector injection Inject 24 Units under the skin into the appropriate area as directed Daily.      Semaglutide,0.25 or 0.5MG/DOS, (Ozempic, 0.25 or 0.5 MG/DOSE,) 2 MG/3ML solution pen-injector Inject 0.25 mg under the skin into the appropriate area as directed 1 (One) Time Per Week. 3 mL 0    naproxen (NAPROSYN) 375 MG tablet Take 1 tablet by mouth 2 (Two) Times a Day As Needed for Mild Pain. (Patient not taking: Reported on 10/30/2024) 30 tablet 0     No facility-administered medications prior to visit.     No opioid medication identified on active medication list. I have reviewed chart for other potential  high risk medication/s and harmful drug interactions in the elderly.      Aspirin is not on active medication list.  Aspirin use is not indicated based on review of current medical condition/s. Risk of harm outweighs potential benefits.  .    Patient Active Problem List   Diagnosis    Type 2 diabetes mellitus with hyperglycemia, with long-term current  "use of insulin    Calculus of gallbladder without cholecystitis without obstruction     Advance Care Planning Advance Directive is not on file.  ACP discussion was held with the patient during this visit. Patient has an advance directive (not in EMR), copy requested.            Objective   Vitals:    11/15/24 1613   BP: 138/68   Pulse: 83   Resp: 14   Temp: 97.5 °F (36.4 °C)   SpO2: 99%   Weight: 89.2 kg (196 lb 9.6 oz)   Height: 162.6 cm (64\")   PainSc:   7       Estimated body mass index is 33.75 kg/m² as calculated from the following:    Height as of this encounter: 162.6 cm (64\").    Weight as of this encounter: 89.2 kg (196 lb 9.6 oz).            Does the patient have evidence of cognitive impairment? No  Lab Results   Component Value Date    HGBA1C 10.1 (H) 09/10/2024                                                                                                Health  Risk Assessment    Smoking Status:  Social History     Tobacco Use   Smoking Status Never    Passive exposure: Never   Smokeless Tobacco Never     Alcohol Consumption:  Social History     Substance and Sexual Activity   Alcohol Use Not Currently       Fall Risk Screen  STEADI Fall Risk Assessment was completed, and patient is at HIGH risk for falls. Assessment completed on:11/15/2024    Depression Screening   Little interest or pleasure in doing things? Not at all   Feeling down, depressed, or hopeless? Not at all   PHQ-2 Total Score 0      Health Habits and Functional and Cognitive Screenin/15/2024     4:17 PM   Functional & Cognitive Status   Do you have difficulty preparing food and eating? No   Do you have difficulty bathing yourself, getting dressed or grooming yourself? No   Do you have difficulty using the toilet? No   Do you have difficulty moving around from place to place? Yes   Do you have trouble with steps or getting out of a bed or a chair? Yes   Current Diet Well Balanced Diet   Dental Exam Up to date   Eye Exam Up to " date   Exercise (times per week) 3 times per week   Current Exercises Include --        Exercise Comment stretching   Do you need help using the phone?  No   Are you deaf or do you have serious difficulty hearing?  No   Do you need help to go to places out of walking distance? No   Do you need help shopping? No   Do you need help preparing meals?  No   Do you need help with housework?  Yes   Do you need help with laundry? No   Do you need help taking your medications? No   Do you need help managing money? No   Do you ever drive or ride in a car without wearing a seat belt? No   Have you felt unusual stress, anger or loneliness in the last month? No   Who do you live with? Other   If you need help, do you have trouble finding someone available to you? No   Have you been bothered in the last four weeks by sexual problems? No   Do you have difficulty concentrating, remembering or making decisions? No           Age-appropriate Screening Schedule:  Refer to the list below for future screening recommendations based on patient's age, sex and/or medical conditions. Orders for these recommended tests are listed in the plan section. The patient has been provided with a written plan.    Health Maintenance List  Health Maintenance   Topic Date Due    COLORECTAL CANCER SCREENING  Never done    DIABETIC FOOT EXAM  Never done    DIABETIC EYE EXAM  Never done    TDAP/TD VACCINES (1 - Tdap) Never done    MAMMOGRAM  Never done    ZOSTER VACCINE (1 of 2) Never done    Pneumococcal Vaccine 65+ (2 of 2 - PCV) 10/25/2021    DXA SCAN  11/06/2021    COVID-19 Vaccine (5 - 2024-25 season) 11/30/2024 (Originally 9/1/2024)    HEMOGLOBIN A1C  03/10/2025    URINE MICROALBUMIN  09/10/2025    ANNUAL WELLNESS VISIT  11/15/2025    BMI FOLLOWUP  11/15/2025    INFLUENZA VACCINE  Completed    HEPATITIS C SCREENING  Discontinued                                                                                                                                 "                CMS Preventative Services Quick Reference  Risk Factors Identified During Encounter  Depression/Dysphoria: Prescribed new antidepressant medication treatment. Follow up visit planned.  Immunizations Discussed/Encouraged: Influenza  Dental Screening Recommended  Vision Screening Recommended    The above risks/problems have been discussed with the patient.  Pertinent information has been shared with the patient in the After Visit Summary.  An After Visit Summary and PPPS were made available to the patient.    Follow Up:   Next Medicare Wellness visit to be scheduled in 1 year.         Additional E&M Note during same encounter follows:  Patient has additional, significant, and separately identifiable condition(s)/problem(s) that require work above and beyond the Medicare Wellness Visit     Chief Complaint  Medicare Wellness-subsequent    Subjective   Overall doing well in clinic today        Gera is also being seen today for an annual adult preventative physical exam.     Review of Systems   Endocrine: Negative for polydipsia, polyphagia and polyuria.   Musculoskeletal:  Positive for arthralgias.      Objective   Vital Signs:  /68   Pulse 83   Temp 97.5 °F (36.4 °C)   Resp 14   Ht 162.6 cm (64\")   Wt 89.2 kg (196 lb 9.6 oz)   SpO2 99%   BMI 33.75 kg/m²   Physical Exam  Vitals and nursing note reviewed.   Constitutional:       General: She is awake.      Appearance: Normal appearance. She is well-developed. She is obese.   HENT:      Head: Normocephalic and atraumatic.      Right Ear: Tympanic membrane, ear canal and external ear normal.      Left Ear: Tympanic membrane, ear canal and external ear normal.      Nose: Nose normal.      Mouth/Throat:      Mouth: Mucous membranes are moist.      Pharynx: Oropharynx is clear.   Eyes:      Extraocular Movements: Extraocular movements intact.      Conjunctiva/sclera: Conjunctivae normal.      Pupils: Pupils are equal, round, and reactive to " light.   Cardiovascular:      Rate and Rhythm: Normal rate and regular rhythm.      Pulses: Normal pulses.           Dorsalis pedis pulses are 2+ on the right side and 2+ on the left side.        Posterior tibial pulses are 2+ on the right side and 2+ on the left side.      Heart sounds: Normal heart sounds.   Pulmonary:      Effort: Pulmonary effort is normal.      Breath sounds: Normal breath sounds.   Abdominal:      General: Bowel sounds are normal.      Palpations: Abdomen is soft.   Musculoskeletal:         General: Normal range of motion.      Cervical back: Normal range of motion and neck supple.      Right lower leg: No edema.      Left lower leg: No edema.      Right foot: Normal range of motion. No deformity.      Left foot: Normal range of motion. No deformity.   Feet:      Right foot:      Protective Sensation: 8 sites tested.  8 sites sensed.      Skin integrity: Skin integrity normal.      Toenail Condition: Right toenails are normal.      Left foot:      Protective Sensation: 8 sites tested.  8 sites sensed.      Skin integrity: Skin integrity normal.      Toenail Condition: Left toenails are normal.   Skin:     General: Skin is warm.      Capillary Refill: Capillary refill takes less than 2 seconds.   Neurological:      General: No focal deficit present.      Mental Status: She is alert and oriented to person, place, and time.   Psychiatric:         Mood and Affect: Mood normal.         Behavior: Behavior normal. Behavior is cooperative.         Thought Content: Thought content normal.         Judgment: Judgment normal.         The following data was reviewed by: ELLIS Lundberg on 11/15/2024:  Data reviewed : previous labs  Common labs          9/7/2024    12:20 9/10/2024    14:56   Common Labs   Glucose 306  304    BUN 14  19    Creatinine 0.70  0.95    Sodium 139  137    Potassium 3.8  4.3    Chloride 103  102    Calcium 9.0  9.2    Total Protein  6.3    Albumin 3.9  3.9    Total Bilirubin  0.5  0.4    Alkaline Phosphatase 64  65    AST (SGOT) 17  15    ALT (SGPT) 9  13    WBC 6.02  7.3    Hemoglobin 14.9  14.4    Hematocrit 44.5  44.4    Platelets 311  324    Hemoglobin A1C  10.1              Assessment and Plan Additional age appropriate preventative wellness advice topics were discussed during today's preventative wellness exam(some topics already addressed during AWV portion of the note above):    Physical Activity: Advised cardiovascular activity 150 minutes per week as tolerated. (example brisk walk for 30 minutes, 5 days a week).     Healthy Weight: Discussed current and goal BMI with patient. Steps to attain this goal discussed. Information shared in after visit summary.     Motor Vehicle Safety Discussion:  Wearing Seatbelt While in Motor Vehicle recommendation. Adhering to posted speed limit recommendation.     Injury Prevention Discussion:  Information shared in after visit summary.       Medicare annual wellness visit, subsequent  Health maintenance:  Continue routine health maintenance including routine dentistry, eye exam, safety seatbelt use, exercise, and proper nutrition.   Exercise 3-4 times a week, 30-45 mins a day  Increase water intake  Monitor for acute illnesses         Osteoarthritis, unspecified osteoarthritis type, unspecified site    Orders:    meloxicam (Mobic) 7.5 MG tablet; Take 1 tablet by mouth Daily.    Screening for colon cancer    Orders:    Ambulatory Referral For Screening Colonoscopy    Type 2 diabetes mellitus with hyperglycemia, with long-term current use of insulin  Diabetes Education  Goal A1C 7 or less  Check glucose at least 1x per day unless otherwise specified  Yearly eye exams  Check feet daily  Eat low carb diet, low sugar  Increase water intake  Exercise 30-45 mins, 3-4x a week  Take meds as prescribed      Orders:    Semaglutide,0.25 or 0.5MG/DOS, (OZEMPIC) 2 MG/3ML solution pen-injector; Inject 0.5 mg under the skin into the appropriate area as  directed 1 (One) Time Per Week.    Screening mammogram for breast cancer    Orders:    Mammo Screening Digital Tomosynthesis Bilateral With CAD; Future    Screening for osteoporosis    Orders:    DEXA Bone Density Axial; Future    Unspecified menopausal and perimenopausal disorder    Orders:    DEXA Bone Density Axial; Future          I spent 30 minutes caring for Gera on this date of service. This time includes time spent by me in the following activities:preparing for the visit, reviewing tests, obtaining and/or reviewing a separately obtained history, and performing a medically appropriate examination and/or evaluation   Follow Up   No follow-ups on file.  Patient was given instructions and counseling regarding her condition or for health maintenance advice. Please see specific information pulled into the AVS if appropriate.      Sonia Flynn, APRN  06:31 EST  11/15/2024

## 2025-01-30 ENCOUNTER — TELEPHONE (OUTPATIENT)
Dept: PAIN MEDICINE | Facility: CLINIC | Age: 75
End: 2025-01-30
Payer: MEDICARE

## 2025-01-30 NOTE — PROGRESS NOTES
Chief complaint/Reason for consult: T2DM    Consult requested by Bobby Quinones MD     HPI: Ms. Dominguez is a 74yoF who comes for consultation of uncontrolled diabetes.     # [id number], Uncontrolled due to hyperglycemia  with complications  # Diabetic polyneuropathy   - Diagnosed: age 47   - Current regimen includes: Tresiba 24u daily   - Previously on Ozempic but stopped it due to cost, did not have any side effects from it  - Compliance with medications is fair  - She has history of gallstones based on ultrasound done 9/17/2024 but is asymptomatic   - HbA1c: 10.1% today, in office  mg/dl today   - Does not checks FSBG at home   - Hypoglycemia does not occur that she is aware of   - Patient has symptoms with lows   - Hyperglycemia occurs daily   - Patient reports neuropathy that is bothersome in her legs and hands; reports Gabapentin helps when she takes it    - Patient denies gastroparesis   - Patient reports rotating injection sites   - Patient without known ASCVD   - not taking ACEi/ARB; blood pressure today 132/78    DM Health Maintenance:  Ophtho: 12/2024, no known retinopathy   Monofilament / Foot exam: 11/2024, no sores, declines exam today   Lipids/Statin: not taking a statin, due for FLP   CECILIA: 30 done 9/10/2024  TSH: 1.470 done 3/6/2024  Aspirin: not taking    Past medical history, past surgical history, family history and social history reviewed within this encounter.     Review of Systems   Constitutional:  Positive for unexpected weight change. Negative for activity change.   HENT:  Negative for trouble swallowing.    Eyes:  Negative for visual disturbance.   Respiratory:  Negative for shortness of breath.    Cardiovascular:  Negative for chest pain.   Gastrointestinal:  Negative for abdominal pain.   Endocrine: Negative for cold intolerance and heat intolerance.   Genitourinary:  Negative for dysuria.   Musculoskeletal:  Positive for arthralgias, back pain and gait problem.   Skin:  Negative for  "rash.   Neurological:  Positive for numbness.   Psychiatric/Behavioral:  Negative for agitation.         /78 (BP Location: Left arm, Patient Position: Sitting, Cuff Size: Adult)   Pulse 84   Ht 162.6 cm (64\")   Wt 89.8 kg (198 lb)   SpO2 97%   BMI 33.99 kg/m²      Physical Exam  Vitals reviewed.   Constitutional:       General: She is not in acute distress.     Appearance: She is obese.   HENT:      Head: Normocephalic.      Nose: Nose normal.   Eyes:      Conjunctiva/sclera: Conjunctivae normal.   Cardiovascular:      Rate and Rhythm: Normal rate.   Abdominal:      Tenderness: There is no guarding.   Musculoskeletal:      Comments: Antalgic gait    Skin:     General: Skin is warm and dry.   Neurological:      Mental Status: She is alert.   Psychiatric:         Mood and Affect: Mood normal.         Thought Content: Thought content normal.        Labs and images reviewed as noted in the HPI    Assessment and plan:    Diagnoses and all orders for this visit:    1. Type 2 diabetes mellitus with hyperglycemia, with long-term current use of insulin (Primary)  Assessment & Plan:  -Diabetes is uncontrolled due to hyperglycemia  -Complications include known diabetic polyneuropathy  -Patient is very high risk for complications due to persistent hyperglycemia; counseled that long term hyperglycemia can cause worsening neuropathy, kidney damage, eye damage and cardiovascular disease  -She needs to start checking FSBG at least once daily  -Increase Tresiba to 30 units once daily, will continue to titrate it by 2 units every 3 days until fasting glucoses less than 130 mg/dL  -Counseled on increased risk of gallbladder dysfunction with GLP-1 agonist along with other GI related side effects, she has known gallstones and is okay with the increased risk with taking a GLP-1 agonist  -Start Ozempic 0.25 mg weekly, sample provided; we will try to get her on patient assistance for further doses; will titrate as able  -Not " taking ACEi/ARB; blood pressure today 132/78    DM Health Maintenance:  Ophtho: 12/2024, no known retinopathy   Monofilament / Foot exam: no sores, declines exam today   Lipids/Statin: not taking a statin, due for FLP   CECILIA: 30 done 9/10/2024  TSH: 1.470 done 3/6/2024  Aspirin: not taking        Orders:  -     POC Glycosylated Hemoglobin (Hb A1C)  -     POC Glucose, Blood  -     glucose monitor monitoring kit; Use to monitor BG up to 4 times daily  Dispense: 1 each; Refill: 0  -     glucose blood test strip; Use as directed daily  Dispense: 100 each; Refill: 3  -     Lancets misc; Use 1 each Daily.  Dispense: 100 each; Refill: 3  -     insulin degludec (TRESIBA FLEXTOUCH) 100 UNIT/ML solution pen-injector injection; Inject 30u once daily; titrate for max daily dose of 50u  Dispense: 15 mL; Refill: 5  -     Insulin Pen Needle 32G X 4 MM misc; Use 1 each Daily.  Dispense: 100 each; Refill: 3         Return in about 2 months (around 3/31/2025) for T2DM.     Please note that portions of this note may have been completed with a voice recognition program. Efforts were made to edit the dictations, but occasionally words are mistranscribed.     Electronically signed by: Kyle S Rosenstein, MD

## 2025-01-30 NOTE — TELEPHONE ENCOUNTER
Provider: DR. BRITO    Caller: Gera Dominguez    Relationship to Patient: Self    Phone Number:[phone]    Reason for Call: PATIENT CALLED WANTING TO KNOW IF THERE ARE OTHER OPTIONS AS TO WHERE SHE CAN GET URINE TEST DONE. SAYS SHE IS HAVING TROUBLE WALKING. REQUESTING A CALL BACK TO ASK ADDITIONAL QUESTIONS. PLEASE ADVISE.

## 2025-01-30 NOTE — TELEPHONE ENCOUNTER
----- Message from Darwin Sanchez sent at 1/29/2025  2:23 PM EST -----  Can we please reach out to this patient to get her f/u appointment scheduled?   Thanks!  SBC

## 2025-01-31 ENCOUNTER — OFFICE VISIT (OUTPATIENT)
Dept: ENDOCRINOLOGY | Facility: CLINIC | Age: 75
End: 2025-01-31
Payer: MEDICARE

## 2025-01-31 VITALS
DIASTOLIC BLOOD PRESSURE: 78 MMHG | OXYGEN SATURATION: 97 % | WEIGHT: 198 LBS | HEART RATE: 84 BPM | SYSTOLIC BLOOD PRESSURE: 132 MMHG | BODY MASS INDEX: 33.8 KG/M2 | HEIGHT: 64 IN

## 2025-01-31 DIAGNOSIS — M19.90 OSTEOARTHRITIS, UNSPECIFIED OSTEOARTHRITIS TYPE, UNSPECIFIED SITE: ICD-10-CM

## 2025-01-31 DIAGNOSIS — Z79.4 TYPE 2 DIABETES MELLITUS WITH HYPERGLYCEMIA, WITH LONG-TERM CURRENT USE OF INSULIN: Primary | ICD-10-CM

## 2025-01-31 DIAGNOSIS — M96.1 POSTLAMINECTOMY SYNDROME: ICD-10-CM

## 2025-01-31 DIAGNOSIS — E11.65 TYPE 2 DIABETES MELLITUS WITH HYPERGLYCEMIA, WITH LONG-TERM CURRENT USE OF INSULIN: Primary | ICD-10-CM

## 2025-01-31 LAB
EXPIRATION DATE: ABNORMAL
EXPIRATION DATE: ABNORMAL
GLUCOSE BLDC GLUCOMTR-MCNC: 346 MG/DL (ref 70–130)
HBA1C MFR BLD: 10.1 % (ref 4.5–5.7)
Lab: ABNORMAL
Lab: ABNORMAL

## 2025-01-31 RX ORDER — LANCETS 30 GAUGE
1 EACH MISCELLANEOUS DAILY
Qty: 100 EACH | Refills: 3 | Status: SHIPPED | OUTPATIENT
Start: 2025-01-31

## 2025-01-31 RX ORDER — BLOOD-GLUCOSE METER
KIT MISCELLANEOUS
Qty: 1 EACH | Refills: 0 | Status: SHIPPED | OUTPATIENT
Start: 2025-01-31

## 2025-01-31 NOTE — TELEPHONE ENCOUNTER
Caller: Gera Dominguez    Relationship: Self    Best call back number: [phone]     Requested Prescriptions:   Requested Prescriptions     Pending Prescriptions Disp Refills    meloxicam (Mobic) 7.5 MG tablet 30 tablet 1     Sig: Take 1 tablet by mouth Daily.    gabapentin (NEURONTIN) 300 MG capsule 60 capsule 1     Sig: Take 1 capsule by mouth 2 (Two) Times a Day.        Pharmacy where request should be sent: [facility] [id number] [address] [phone] [facility] [phone]      Last office visit with prescribing clinician: 11/15/2024   Last telemedicine visit with prescribing clinician: Visit date not found   Next office visit with prescribing clinician: Visit date not found     Additional details provided by patient: PATIENT IS OUT    Does the patient have less than a 3 day supply:  [] Yes  [] No    Would you like a call back once the refill request has been completed: [] Yes [x] No    If the office needs to give you a call back, can they leave a voicemail: [] Yes [x] No    Elle Ash Rep   01/31/25 16:03 EST

## 2025-01-31 NOTE — TELEPHONE ENCOUNTER
Attempted to contact patient but no answer. LVM to call the office back and provided a call back number.

## 2025-01-31 NOTE — ASSESSMENT & PLAN NOTE
-Diabetes is uncontrolled due to hyperglycemia  -Complications include known diabetic polyneuropathy  -Patient is very high risk for complications due to persistent hyperglycemia; counseled that long term hyperglycemia can cause worsening neuropathy, kidney damage, eye damage and cardiovascular disease  -She needs to start checking FSBG at least once daily  -Increase Tresiba to 30 units once daily, will continue to titrate it by 2 units every 3 days until fasting glucoses less than 130 mg/dL  -Counseled on increased risk of gallbladder dysfunction with GLP-1 agonist along with other GI related side effects, she has known gallstones and is okay with the increased risk with taking a GLP-1 agonist  -Start Ozempic 0.25 mg weekly, sample provided; we will try to get her on patient assistance for further doses; will titrate as able  -Not taking ACEi/ARB; blood pressure today 132/78    DM Health Maintenance:  Ophtho: 12/2024, no known retinopathy   Monofilament / Foot exam: no sores, declines exam today   Lipids/Statin: not taking a statin, due for FLP   CECILIA: 30 done 9/10/2024  TSH: 1.470 done 3/6/2024  Aspirin: not taking

## 2025-02-04 RX ORDER — GABAPENTIN 300 MG/1
300 CAPSULE ORAL 2 TIMES DAILY
Qty: 60 CAPSULE | Refills: 1 | Status: SHIPPED | OUTPATIENT
Start: 2025-02-04

## 2025-02-04 RX ORDER — MELOXICAM 7.5 MG/1
7.5 TABLET ORAL DAILY
Qty: 30 TABLET | Refills: 1 | Status: SHIPPED | OUTPATIENT
Start: 2025-02-04

## 2025-02-21 ENCOUNTER — TELEPHONE (OUTPATIENT)
Dept: ENDOCRINOLOGY | Facility: CLINIC | Age: 75
End: 2025-02-21
Payer: MEDICARE

## 2025-02-21 NOTE — TELEPHONE ENCOUNTER
Patient called inquiring about patient assistance and medication arrival.  Informed patient that her paperwork was in the chart and that we had not received the medication to this date.  She will be contacted for medication pickup as soon as it arrives.

## 2025-02-21 NOTE — TELEPHONE ENCOUNTER
Caller: Gera Dominguez    Relationship: Self    Best call back number: [phone]    What is the best time to reach you: ANYTIME     Who are you requesting to speak with (clinical staff, provider,  specific staff member): CLINICAL    Do you know the name of the person who called: SELF     What was the call regarding: PATIENT HAD OZEMPIC AND INSULIN ORDERED FOR HER NEEDING TO KNOW WHERE IT WAS SENT TO     Is it okay if the provider responds through MyChart:

## 2025-02-27 ENCOUNTER — TELEPHONE (OUTPATIENT)
Dept: ENDOCRINOLOGY | Facility: CLINIC | Age: 75
End: 2025-02-27
Payer: MEDICARE

## 2025-02-27 NOTE — TELEPHONE ENCOUNTER
Caller: Gera Dominguez    Relationship: Self    Best call back number:   Telephone Information:   Mobile [phone]       What is the best time to reach you: ANYTIME    Who are you requesting to speak with (clinical staff, provider,  specific staff member): CLINICAL STAFF / PROVIDER     What was the call regarding: PT CALLED STATING HER PRESCRIPTION WAS SENT A MONTH AGO AND SHE IS OUT OF MEDICATION FOR OZEMPIC AND TRULICITY. PT WONDERING IF THERE IS ANY SAMPLES FOR HER TO . PLEASE ADVISE.

## 2025-02-27 NOTE — TELEPHONE ENCOUNTER
Spoke with pt and he rpt Assistance has not arrived and she has no ins to take.    She is going to come by office and  samples.

## 2025-03-18 ENCOUNTER — TELEPHONE (OUTPATIENT)
Dept: ENDOCRINOLOGY | Facility: CLINIC | Age: 75
End: 2025-03-18
Payer: MEDICARE

## 2025-04-19 DIAGNOSIS — M19.90 OSTEOARTHRITIS, UNSPECIFIED OSTEOARTHRITIS TYPE, UNSPECIFIED SITE: ICD-10-CM

## 2025-04-19 DIAGNOSIS — M96.1 POSTLAMINECTOMY SYNDROME: ICD-10-CM

## 2025-04-21 RX ORDER — MELOXICAM 7.5 MG/1
7.5 TABLET ORAL DAILY
Qty: 30 TABLET | Refills: 1 | Status: SHIPPED | OUTPATIENT
Start: 2025-04-21

## 2025-04-21 RX ORDER — GABAPENTIN 300 MG/1
300 CAPSULE ORAL 2 TIMES DAILY
Qty: 60 CAPSULE | Refills: 0 | Status: SHIPPED | OUTPATIENT
Start: 2025-04-21

## 2025-05-01 ENCOUNTER — APPOINTMENT (OUTPATIENT)
Dept: GENERAL RADIOLOGY | Facility: HOSPITAL | Age: 75
End: 2025-05-01
Payer: MEDICARE

## 2025-05-01 ENCOUNTER — HOSPITAL ENCOUNTER (EMERGENCY)
Facility: HOSPITAL | Age: 75
Discharge: HOME | End: 2025-05-01
Attending: STUDENT IN AN ORGANIZED HEALTH CARE EDUCATION/TRAINING PROGRAM
Payer: MEDICARE

## 2025-05-01 ENCOUNTER — APPOINTMENT (OUTPATIENT)
Dept: CT IMAGING | Facility: HOSPITAL | Age: 75
End: 2025-05-01
Payer: MEDICARE

## 2025-05-01 VITALS
WEIGHT: 185 LBS | RESPIRATION RATE: 18 BRPM | HEART RATE: 73 BPM | TEMPERATURE: 98.3 F | HEIGHT: 63 IN | SYSTOLIC BLOOD PRESSURE: 181 MMHG | OXYGEN SATURATION: 93 % | BODY MASS INDEX: 32.78 KG/M2 | DIASTOLIC BLOOD PRESSURE: 85 MMHG

## 2025-05-01 DIAGNOSIS — S42.212A CLOSED FRACTURE OF NECK OF LEFT HUMERUS, INITIAL ENCOUNTER: Primary | ICD-10-CM

## 2025-05-01 DIAGNOSIS — M79.602 PAIN OF LEFT UPPER EXTREMITY: ICD-10-CM
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
## 2025-05-01 PROCEDURE — 73030 X-RAY EXAM OF SHOULDER: CPT

## 2025-05-01 PROCEDURE — 73200 CT UPPER EXTREMITY W/O DYE: CPT

## 2025-05-01 PROCEDURE — 73060 X-RAY EXAM OF HUMERUS: CPT

## 2025-05-01 PROCEDURE — 73080 X-RAY EXAM OF ELBOW: CPT

## 2025-05-01 PROCEDURE — 99284 EMERGENCY DEPT VISIT MOD MDM: CPT

## 2025-05-01 RX ORDER — OXYCODONE HYDROCHLORIDE 5 MG/1
5 TABLET ORAL ONCE
Refills: 0 | Status: COMPLETED | OUTPATIENT
Start: 2025-05-01 | End: 2025-05-01

## 2025-05-01 RX ORDER — IBUPROFEN 600 MG/1
600 TABLET, FILM COATED ORAL EVERY 6 HOURS PRN
Qty: 20 TABLET | Refills: 0 | Status: SHIPPED | OUTPATIENT
Start: 2025-05-01

## 2025-05-01 RX ORDER — ACETAMINOPHEN 500 MG
1000 TABLET ORAL ONCE
Status: COMPLETED | OUTPATIENT
Start: 2025-05-01 | End: 2025-05-01

## 2025-05-01 RX ORDER — IBUPROFEN 800 MG/1
800 TABLET, FILM COATED ORAL ONCE
Status: COMPLETED | OUTPATIENT
Start: 2025-05-01 | End: 2025-05-01

## 2025-05-01 RX ORDER — LIDOCAINE 50 MG/G
1 PATCH TOPICAL EVERY 24 HOURS
Qty: 6 PATCH | Refills: 0 | Status: SHIPPED | OUTPATIENT
Start: 2025-05-01

## 2025-05-01 RX ORDER — OXYCODONE HYDROCHLORIDE 5 MG/1
5 TABLET ORAL EVERY 4 HOURS PRN
Qty: 10 TABLET | Refills: 0 | Status: SHIPPED | OUTPATIENT
Start: 2025-05-01

## 2025-05-01 RX ADMIN — OXYCODONE 5 MG: 5 TABLET ORAL at 19:47

## 2025-05-01 RX ADMIN — OXYCODONE 5 MG: 5 TABLET ORAL at 16:31

## 2025-05-01 RX ADMIN — ACETAMINOPHEN 1000 MG: 500 TABLET ORAL at 16:31

## 2025-05-01 RX ADMIN — IBUPROFEN 800 MG: 800 TABLET, FILM COATED ORAL at 16:31

## 2025-05-01 NOTE — ED PROVIDER NOTES
EMERGENCY DEPARTMENT ENCOUNTER    Pt Name: Gera Dominguez  MRN: [id number]  Pt :   1950  Room Number:    Date of encounter:  2025  PCP: Sonia Flynn APRN  ED Provider: Tao Patel MD    Historian: Patient      HPI:  Chief Complaint: Fall, shoulder pain        Context: Gera Dominguez is a 74-year-old woman with history of spinal stenosis and chronic pain and multiple fractures, who presents for what she says is a broken shoulder she says she was on medication and she fell landing on her left side she went to an outside hospital there where she was told that her left shoulder was broken and would need surgery she did not want to get surgery there so elected to leave and come back to [place].  She was placed in a sling there but has not been using it because she says it makes the pain worse.  She describes her pain as severe and extending from the left shoulder to the mid humerus.  She denies other pain or injuries.  No other complaints at this time.      PAST MEDICAL HISTORY  Past Medical History:   Diagnosis Date    Arthritis     Diabetes mellitus     Spinal stenosis          PAST SURGICAL HISTORY  Past Surgical History:   Procedure Laterality Date    ANKLE ARTHROPLASTY      BACK SURGERY      HYSTERECTOMY      SPINE SURGERY      TONSILLECTOMY           FAMILY HISTORY  No family history on file.      SOCIAL HISTORY  Social History     Socioeconomic History    Marital status:    Tobacco Use    Smoking status: Never     Passive exposure: Never    Smokeless tobacco: Never   Vaping Use    Vaping status: Never Used   Substance and Sexual Activity    Alcohol use: Not Currently    Drug use: Never    Sexual activity: Defer         ALLERGIES  Statins, Glucophage [metformin], and Ace inhibitors        REVIEW OF SYSTEMS  Review of Systems       All systems reviewed and negative except for those discussed in HPI.       PHYSICAL EXAM    I have reviewed the triage vital signs and  nursing notes.    ED Triage Vitals [05/01/25 1606]   Temp Heart Rate Resp BP SpO2   98.3 °F (36.8 °C) 71 18 (!) 193/90 97 %      Temp src Heart Rate Source Patient Position BP Location FiO2 (%)   Oral Monitor Sitting Right arm --       Physical Exam  GENERAL:   Appears uncomfortable but in no acute distress  HENT: Nares patent.  EYES: No scleral icterus.  CV: Regular rhythm, regular rate.  RESPIRATORY: Normal effort.  No audible wheezes, rales or rhonchi.  ABDOMEN: Soft, nontender  MUSCULOSKELETAL: No deformities.  Tenderness and mild swelling over the left humeral head, no clavicle tenderness, tenderness extends through the humerus to the left elbow, distal to this flexion and extension  strength intact of the hand, palpable radial pulse, good cap refill  NEURO: Alert, moves all extremities, follows commands.  SKIN: Warm, dry, no rash visualized.      LAB RESULTS  No results found for this or any previous visit (from the past 24 hours).    If labs were ordered, I independently reviewed the results and considered them in treating the patient.        RADIOLOGY  CT Upper Extremity Left Without Contrast  Result Date: 5/1/2025  CT UPPER EXTREMITY LEFT WO CONTRAST Date of Exam: 5/1/2025 6:27 PM EDT Indication: humeral neck fracture after fall, requested by Ortho to survey for associated dislocation. Comparison: Left shoulder radiograph 5/1/2025 Technique: Axial CT images were obtained of the left upper extremity without contrast administration.  Reconstructed coronal and sagittal images were also obtained. Automated exposure control and iterative construction methods were used. Findings: Acute multipart fracture of the proximal left humerus. Transversely oriented fracture component through the surgical neck is impacted and displaced anteriorly by 5 mm relative to humeral head. Nondisplaced fracture component extends into the greater tuberosity. Glenohumeral alignment maintained. Tiny chronic appearing curvilinear  fragment and/or degenerative related change at the anterior aspect of the glenoid image 35 series 2. Maintained AC joint alignment. Negative for scapular fracture or visualized rib fracture. Probable small glenohumeral joint effusion. There is reticular subcutaneous edema along the lateral aspect of the deltoid muscle. Musculoskeletal structures otherwise unremarkable. Mild degenerative osteoarthritis of the glenohumeral and AC joint. No significant undersurface spurring of the distal clavicle. No visualized erosions or chronic calcinosis. Partially imaged cervical fusion hardware.     Impression: Acute multipart fracture of the proximal humerus, without traumatic glenohumeral dislocation. Electronically Signed: Santo Lara MD  5/1/2025 8:06 PM EDT  Workstation ID: [id number]    XR Shoulder 2+ View Left  Result Date: 5/1/2025  XR ELBOW 3+ VW LEFT, XR SHOULDER 2+ VW LEFT, XR HUMERUS LEFT Date of Exam: 5/1/2025 4:22 PM EDT Indication: Fall, left elbow, upper arm, and shoulder pain Comparison: None available. Findings: Left shoulder: There is a comminuted impaction fracture of the humeral neck. The glenohumeral joint remains normally aligned. The acromioclavicular joint is also intact. There is soft tissue swelling. Left humerus: There is a comminuted transverse impaction fracture of the left humeral neck. There are no additional acute bony abnormalities. There is soft tissue swelling about the left shoulder. Left elbow: There is no acute fracture or dislocation. There are no displaced fat pads to indicate an occult fracture. The joint spaces are well-maintained. The soft tissues are unremarkable.     Impression: 1.Comminuted transverse impaction fracture of the left humeral neck. 2.No additional acute bony abnormalities. 3.Soft tissue swelling about the left shoulder. Electronically Signed: Sudarshan Blount MD  5/1/2025 4:57 PM EDT  Workstation ID: [id number]    XR Humerus Left  Result Date: 5/1/2025  XR ELBOW 3+ VW LEFT,  XR SHOULDER 2+ VW LEFT, XR HUMERUS LEFT Date of Exam: 5/1/2025 4:22 PM EDT Indication: Fall, left elbow, upper arm, and shoulder pain Comparison: None available. Findings: Left shoulder: There is a comminuted impaction fracture of the humeral neck. The glenohumeral joint remains normally aligned. The acromioclavicular joint is also intact. There is soft tissue swelling. Left humerus: There is a comminuted transverse impaction fracture of the left humeral neck. There are no additional acute bony abnormalities. There is soft tissue swelling about the left shoulder. Left elbow: There is no acute fracture or dislocation. There are no displaced fat pads to indicate an occult fracture. The joint spaces are well-maintained. The soft tissues are unremarkable.     Impression: 1.Comminuted transverse impaction fracture of the left humeral neck. 2.No additional acute bony abnormalities. 3.Soft tissue swelling about the left shoulder. Electronically Signed: Sudarshan Blount MD  5/1/2025 4:57 PM EDT  Workstation ID: [id number]    XR Elbow 3+ View Left  Result Date: 5/1/2025  XR ELBOW 3+ VW LEFT, XR SHOULDER 2+ VW LEFT, XR HUMERUS LEFT Date of Exam: 5/1/2025 4:22 PM EDT Indication: Fall, left elbow, upper arm, and shoulder pain Comparison: None available. Findings: Left shoulder: There is a comminuted impaction fracture of the humeral neck. The glenohumeral joint remains normally aligned. The acromioclavicular joint is also intact. There is soft tissue swelling. Left humerus: There is a comminuted transverse impaction fracture of the left humeral neck. There are no additional acute bony abnormalities. There is soft tissue swelling about the left shoulder. Left elbow: There is no acute fracture or dislocation. There are no displaced fat pads to indicate an occult fracture. The joint spaces are well-maintained. The soft tissues are unremarkable.     Impression: 1.Comminuted transverse impaction fracture of the left humeral neck. 2.No  additional acute bony abnormalities. 3.Soft tissue swelling about the left shoulder. Electronically Signed: Sudarshan Blount MD  5/1/2025 4:57 PM EDT  Workstation ID: [id number]      I ordered and independently reviewed the above noted radiographic studies.      I viewed images of x-ray left elbow humerus and shoulder showing comminuted humeral neck fracture on my interpretation  CT scan of the shoulder continuing to demonstrate the humeral neck fracture but no acute dislocation or other acute pathology that I can appreciate  See radiologist's dictation for official interpretation.        PROCEDURES    Procedures    No orders to display       MEDICATIONS GIVEN IN ER    Medications   oxyCODONE (ROXICODONE) immediate release tablet 5 mg (5 mg Oral Given 5/1/25 1631)   ibuprofen (ADVIL,MOTRIN) tablet 800 mg (800 mg Oral Given 5/1/25 1631)   acetaminophen (TYLENOL) tablet 1,000 mg (1,000 mg Oral Given 5/1/25 1631)   oxyCODONE (ROXICODONE) immediate release tablet 5 mg (5 mg Oral Given 5/1/25 1947)         MEDICAL DECISION MAKING, PROGRESS, and CONSULTS    All labs, if obtained, have been independently reviewed by me.  All radiology studies, if obtained, have been reviewed by me and the radiologist dictating the report.  All EKGs, if obtained, have been independently viewed and interpreted by me/my attending physician.      Discussion below represents my analysis of pertinent findings related to patient's condition, differential diagnosis, treatment plan and final disposition.                                          Differential diagnosis:    Shoulder fracture, shoulder dislocation, humeral injury or elbow injury, other traumatic injuries, nerve injury, vascular injury      Additional sources:    - Discussed/ obtained information from independent historians:      - External (non-ED) record review:  Chart review of outpatient PCP note shows history of:  Osteoarthritis, unspecified osteoarthritis type, unspecified  site    Screening for colon cancer    Type 2 diabetes mellitus with hyperglycemia, with long-term current use of insulin    Screening mammogram for breast cancer    Screening for osteoporosis    Unspecified menopausal and perimenopausal disorder    - Chronic or social conditions impacting care: Chronic pain on multiple controlled substances, diabetes, BMI 32        Orders placed during this visit:  Orders Placed This Encounter   Procedures    XR Shoulder 2+ View Left    XR Humerus Left    XR Elbow 3+ View Left    CT Upper Extremity Left Without Contrast    Obtain & Apply The Following- Upper extremity; Sling         Additional orders considered but not ordered:      ED Course:    Consultants: Orthopedic surgery ([facility])    ED Course as of 05/01/25 2346   Thu May 01, 2025   1621 This is a 74-year-old woman with history of spinal stenosis and chronic pain and multiple fractures, who presents for what she says is a broken shoulder she says she was on medication and she fell landing on her left side she went to an outside hospital there where she was told that her left shoulder was broken and would need surgery she did not want to get surgery there so elected to leave and come back to [place].  She was placed in a sling there but has not been using it because she says it makes the pain worse.  She describes her pain as severe and extending from the left shoulder to the mid humerus.  She denies other pain or injuries.  No other complaints at this time. [CC]   1622 She arrived awake and alert she does have some swelling and tenderness along the left humeral head and endorses tenderness to palpation down to the left elbow  strength [CC]   1622  wrist flexion and extension are intact, palpable radial pulse.  I can see a read for a proximal humerus fracture but cannot see the outside images on repeating x-rays here.  Treating with oxycodone ibuprofen and acetaminophen will reevaluate pending x-rays. [CC]   1624 Chart  review of outpatient PCP note shows history of:  Osteoarthritis, unspecified osteoarthritis type, unspecified site    Screening for colon cancer    Type 2 diabetes mellitus with hyperglycemia, with long-term current use of insulin    Screening mammogram for breast cancer    Screening for osteoporosis    Unspecified menopausal and perimenopausal disorder   [CC]   1725 X-ray demonstrates comminuted humeral neck fracture.  I discussed the case with Dr. Vaz the on-call orthopedist who has reviewed the images and is concerned that there may be an associated dislocation that would require admission he has requested a CT scan of the shoulder. [CC]   2345 Continue to have ongoing pain but pain improved significantly after a second dose of pain medication.  CT scan continues to demonstrate the fracture but no dislocation or anything that would require emergent surgery or admission.  Dr. Vaz recommends that she follow-up with his partner Dr. Marroquin.  She is agreeable to this plan.  We placed her back in a sling and have explained to her the importance of staying in the sling.  Discharged in stable condition with pain medication and return precautions. [CC]      ED Course User Index  [CC] Tao Patel MD              Shared Decision Making:  After my consideration of clinical presentation and any laboratory/radiology studies obtained, I discussed the findings with the patient/patient representative who is in agreement with the treatment plan and the final disposition.   Risks and benefits of discharge and/or observation/admission were discussed.       AS OF 23:46 EDT VITALS:    BP - (!) 181/85  HR - 73  TEMP - 98.3 °F (36.8 °C) (Oral)  O2 SATS - 93%      [facility] reviewed by Tao Patel MD           DIAGNOSIS  Final diagnoses:   Closed fracture of neck of left humerus, initial encounter   Fall, subsequent encounter   Pain of left upper extremity         DISPOSITION  DISCHARGE    Patient discharged in  stable condition.    Reviewed implications of results, diagnosis, meds, responsibility to follow up, warning signs and symptoms of possible worsening, potential complications and reasons to return to ER.    Patient/Family voiced understanding of above instructions.    Discussed plan for discharge, as there is no emergent indication for admission.  Pt/family is agreeable and understands need for follow up and possible repeat testing.  Pt/family is aware that discharge does not mean that nothing is wrong but that it indicates no emergency is currently present that requires admission and they must continue care with follow-up as given below or with a physician of their choice.     FOLLOW-UP  Evelio Marroquin MD  [address] [id number] [phone]    Call   For orthopedic surgery follow-up.         Medication List        New Prescriptions      ibuprofen 600 MG tablet  Commonly known as: ADVIL,MOTRIN  Take 1 tablet by mouth Every 6 (Six) Hours As Needed for Mild Pain.     lidocaine 5 %  Commonly known as: LIDODERM  Place 1 patch on the skin as directed by provider Daily. Remove & Discard patch within 12 hours or as directed by MD     oxyCODONE 5 MG immediate release tablet  Commonly known as: Roxicodone  Take 1 tablet by mouth Every 4 (Four) Hours As Needed for Severe Pain.               Where to Get Your Medications        These medications were sent to [id number] - [place], [place] - [address] [phone]  - [phone] FX  [address] [address]      Phone: [phone]   ibuprofen 600 MG tablet  lidocaine 5 %  oxyCODONE 5 MG immediate release tablet             Please note that portions of this document were completed with voice recognition software.        Tao Patel MD  05/01/25 6683

## 2025-05-02 NOTE — DISCHARGE INSTRUCTIONS
Keeping the arm in the sling with your elbow as low as possible will help with pain significantly.  You can also use the provided pain medicine to help with symptoms.  Contact the provided orthopedist to arrange follow-up.  While today's workup was reassuring if symptoms change or worsen please return to the ED or seek other medical care.

## 2025-05-06 ENCOUNTER — OFFICE VISIT (OUTPATIENT)
Age: 75
End: 2025-05-06
Payer: MEDICARE

## 2025-05-06 VITALS — SYSTOLIC BLOOD PRESSURE: 140 MMHG | DIASTOLIC BLOOD PRESSURE: 84 MMHG

## 2025-05-06 DIAGNOSIS — S42.292A HUMERAL HEAD FRACTURE, LEFT, CLOSED, INITIAL ENCOUNTER: Primary | ICD-10-CM

## 2025-05-06 RX ORDER — HYDROCODONE BITARTRATE AND ACETAMINOPHEN 5; 325 MG/1; MG/1
1 TABLET ORAL EVERY 6 HOURS PRN
Qty: 20 TABLET | Refills: 0 | Status: SHIPPED | OUTPATIENT
Start: 2025-05-06

## 2025-05-06 RX ORDER — TIMOLOL MALEATE 5 MG/ML
SOLUTION/ DROPS OPHTHALMIC
COMMUNITY
Start: 2025-04-14

## 2025-05-06 RX ORDER — BLOOD-GLUCOSE METER
EACH MISCELLANEOUS
COMMUNITY
Start: 2025-01-31

## 2025-05-06 RX ORDER — LATANOPROST 50 UG/ML
SOLUTION/ DROPS OPHTHALMIC
COMMUNITY
Start: 2025-03-17
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
## 2025-05-13 ENCOUNTER — OFFICE VISIT (OUTPATIENT)
Age: 75
End: 2025-05-13
Payer: MEDICARE

## 2025-05-13 VITALS
HEIGHT: 63 IN | BODY MASS INDEX: 32.78 KG/M2 | SYSTOLIC BLOOD PRESSURE: 128 MMHG | WEIGHT: 185 LBS | DIASTOLIC BLOOD PRESSURE: 82 MMHG

## 2025-05-13 DIAGNOSIS — Z09 FRACTURE FOLLOW-UP: Primary | ICD-10-CM

## 2025-05-13 DIAGNOSIS — S42.292A CLOSED 3-PART FRACTURE OF PROXIMAL END OF LEFT HUMERUS, INITIAL ENCOUNTER: ICD-10-CM

## 2025-05-13 DIAGNOSIS — S42.292A HUMERAL HEAD FRACTURE, LEFT, CLOSED, INITIAL ENCOUNTER: ICD-10-CM

## 2025-05-13 RX ORDER — HYDROCODONE BITARTRATE AND ACETAMINOPHEN 5; 325 MG/1; MG/1
1 TABLET ORAL EVERY 12 HOURS PRN
Qty: 14 TABLET | Refills: 0 | Status: SHIPPED | OUTPATIENT
Start: 2025-05-13
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
## 2025-06-03 ENCOUNTER — OFFICE VISIT (OUTPATIENT)
Age: 75
End: 2025-06-03
Payer: MEDICARE

## 2025-06-03 VITALS
HEIGHT: 63 IN | SYSTOLIC BLOOD PRESSURE: 140 MMHG | WEIGHT: 195 LBS | BODY MASS INDEX: 34.55 KG/M2 | DIASTOLIC BLOOD PRESSURE: 78 MMHG

## 2025-06-03 DIAGNOSIS — S42.292A CLOSED 3-PART FRACTURE OF PROXIMAL END OF LEFT HUMERUS, INITIAL ENCOUNTER: ICD-10-CM

## 2025-06-03 DIAGNOSIS — Z09 FRACTURE FOLLOW-UP: Primary | ICD-10-CM
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
## 2025-06-09 DIAGNOSIS — M96.1 POSTLAMINECTOMY SYNDROME: ICD-10-CM

## 2025-06-10 RX ORDER — GABAPENTIN 300 MG/1
300 CAPSULE ORAL 2 TIMES DAILY
Qty: 60 CAPSULE | OUTPATIENT
Start: 2025-06-10

## 2025-06-11 DIAGNOSIS — M96.1 POSTLAMINECTOMY SYNDROME: ICD-10-CM

## 2025-06-11 RX ORDER — GABAPENTIN 300 MG/1
300 CAPSULE ORAL 2 TIMES DAILY
Qty: 60 CAPSULE | Refills: 0 | Status: SHIPPED | OUTPATIENT
Start: 2025-06-11

## 2025-06-11 NOTE — TELEPHONE ENCOUNTER
Caller: Gera Dominguez ALLEY    Relationship: Self    Best call back number: [phone]     Requested Prescriptions:   Requested Prescriptions     Pending Prescriptions Disp Refills    gabapentin (NEURONTIN) 300 MG capsule 60 capsule 0     Sig: Take 1 capsule by mouth 2 (Two) Times a Day.        Pharmacy where request should be sent: [facility] [id number] [address] [phone] [facility] [phone]      Last office visit with prescribing clinician: 11/15/2024   Last telemedicine visit with prescribing clinician: Visit date not found   Next office visit with prescribing clinician: 6/17/2025     Additional details provided by patient: PATIENT IS OUT.     Does the patient have less than a 3 day supply:  [x] Yes  [] No    Would you like a call back once the refill request has been completed: [] Yes [x] No    If the office needs to give you a call back, can they leave a voicemail: [] Yes [x] No    Cadance Dunaway, RegSched Rep   06/11/25 12:39 EDT

## 2025-06-24 ENCOUNTER — OFFICE VISIT (OUTPATIENT)
Dept: FAMILY MEDICINE CLINIC | Facility: CLINIC | Age: 75
End: 2025-06-24
Payer: MEDICARE

## 2025-06-24 VITALS
HEIGHT: 63 IN | SYSTOLIC BLOOD PRESSURE: 154 MMHG | HEART RATE: 82 BPM | RESPIRATION RATE: 18 BRPM | BODY MASS INDEX: 34.73 KG/M2 | OXYGEN SATURATION: 96 % | DIASTOLIC BLOOD PRESSURE: 82 MMHG | WEIGHT: 196 LBS | TEMPERATURE: 98.2 F

## 2025-06-24 DIAGNOSIS — F51.01 PRIMARY INSOMNIA: ICD-10-CM

## 2025-06-24 DIAGNOSIS — R53.82 CHRONIC FATIGUE: ICD-10-CM

## 2025-06-24 DIAGNOSIS — S42.202D CLOSED FRACTURE OF PROXIMAL END OF LEFT HUMERUS WITH ROUTINE HEALING, UNSPECIFIED FRACTURE MORPHOLOGY, SUBSEQUENT ENCOUNTER: ICD-10-CM

## 2025-06-24 DIAGNOSIS — E11.65 TYPE 2 DIABETES MELLITUS WITH HYPERGLYCEMIA, WITH LONG-TERM CURRENT USE OF INSULIN: ICD-10-CM

## 2025-06-24 DIAGNOSIS — F32.9 REACTIVE DEPRESSION: Primary | ICD-10-CM

## 2025-06-24 DIAGNOSIS — Z79.4 TYPE 2 DIABETES MELLITUS WITH HYPERGLYCEMIA, WITH LONG-TERM CURRENT USE OF INSULIN: ICD-10-CM

## 2025-06-24 RX ORDER — MIRTAZAPINE 7.5 MG/1
7.5 TABLET, FILM COATED ORAL NIGHTLY
Qty: 30 TABLET | Refills: 1 | Status: SHIPPED | OUTPATIENT
Start: 2025-06-24

## 2025-06-24 RX ORDER — MELOXICAM 7.5 MG/1
7.5 TABLET ORAL DAILY
COMMUNITY
Start: 2025-06-09
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
## 2025-06-25 LAB
ALBUMIN SERPL-MCNC: 4.5 G/DL (ref 3.8–4.8)
ALP SERPL-CCNC: 64 IU/L (ref 44–121)
ALT SERPL-CCNC: 18 IU/L (ref 0–32)
AST SERPL-CCNC: 16 IU/L (ref 0–40)
BASOPHILS # BLD AUTO: 0 X10E3/UL (ref 0–0.2)
BASOPHILS NFR BLD AUTO: 0 %
BILIRUB SERPL-MCNC: 0.4 MG/DL (ref 0–1.2)
BUN SERPL-MCNC: 25 MG/DL (ref 8–27)
BUN/CREAT SERPL: 27 (ref 12–28)
CALCIUM SERPL-MCNC: 10.1 MG/DL (ref 8.7–10.3)
CHLORIDE SERPL-SCNC: 99 MMOL/L (ref 96–106)
CO2 SERPL-SCNC: 21 MMOL/L (ref 20–29)
CREAT SERPL-MCNC: 0.92 MG/DL (ref 0.57–1)
EGFRCR SERPLBLD CKD-EPI 2021: 65 ML/MIN/1.73
EOSINOPHIL # BLD AUTO: 0.1 X10E3/UL (ref 0–0.4)
EOSINOPHIL NFR BLD AUTO: 2 %
ERYTHROCYTE [DISTWIDTH] IN BLOOD BY AUTOMATED COUNT: 13.8 % (ref 11.7–15.4)
GLOBULIN SER CALC-MCNC: 2.4 G/DL (ref 1.5–4.5)
GLUCOSE SERPL-MCNC: 233 MG/DL (ref 70–99)
HBA1C MFR BLD: 7.4 % (ref 4.8–5.6)
HCT VFR BLD AUTO: 48 % (ref 34–46.6)
HGB BLD-MCNC: 15 G/DL (ref 11.1–15.9)
IMM GRANULOCYTES # BLD AUTO: 0 X10E3/UL (ref 0–0.1)
IMM GRANULOCYTES NFR BLD AUTO: 0 %
LYMPHOCYTES # BLD AUTO: 2.6 X10E3/UL (ref 0.7–3.1)
LYMPHOCYTES NFR BLD AUTO: 28 %
MCH RBC QN AUTO: 31 PG (ref 26.6–33)
MCHC RBC AUTO-ENTMCNC: 31.3 G/DL (ref 31.5–35.7)
MCV RBC AUTO: 99 FL (ref 79–97)
MONOCYTES # BLD AUTO: 0.6 X10E3/UL (ref 0.1–0.9)
MONOCYTES NFR BLD AUTO: 6 %
NEUTROPHILS # BLD AUTO: 5.8 X10E3/UL (ref 1.4–7)
NEUTROPHILS NFR BLD AUTO: 64 %
PLATELET # BLD AUTO: 349 X10E3/UL (ref 150–450)
POTASSIUM SERPL-SCNC: 4.6 MMOL/L (ref 3.5–5.2)
PROT SERPL-MCNC: 6.9 G/DL (ref 6–8.5)
RBC # BLD AUTO: 4.84 X10E6/UL (ref 3.77–5.28)
SODIUM SERPL-SCNC: 137 MMOL/L (ref 134–144)
T4 FREE SERPL-MCNC: 1.2 NG/DL (ref 0.82–1.77)
TSH SERPL DL<=0.005 MIU/L-ACNC: 1.3 UIU/ML (ref 0.45–4.5)
WBC # BLD AUTO: 9.1 X10E3/UL (ref 3.4–10.8)

## 2025-06-27 ENCOUNTER — TELEPHONE (OUTPATIENT)
Dept: ENDOCRINOLOGY | Facility: CLINIC | Age: 75
End: 2025-06-27
Payer: MEDICARE

## 2025-06-27 NOTE — TELEPHONE ENCOUNTER
PT ASSISTANCE  is here and ready for  tresbia, ozempic, and pen needles are ready for . Pt notified and verbalized understanding

## 2025-07-01 ENCOUNTER — OFFICE VISIT (OUTPATIENT)
Age: 75
End: 2025-07-01
Payer: MEDICARE

## 2025-07-01 VITALS
BODY MASS INDEX: 34.73 KG/M2 | WEIGHT: 195.99 LBS | DIASTOLIC BLOOD PRESSURE: 96 MMHG | HEIGHT: 63 IN | SYSTOLIC BLOOD PRESSURE: 138 MMHG

## 2025-07-01 DIAGNOSIS — Z09 FRACTURE FOLLOW-UP: Primary | ICD-10-CM

## 2025-07-01 DIAGNOSIS — S42.292D CLOSED 3-PART FRACTURE OF PROXIMAL END OF LEFT HUMERUS WITH ROUTINE HEALING, SUBSEQUENT ENCOUNTER: ICD-10-CM

## 2025-07-01 PROCEDURE — 99213 OFFICE O/P EST LOW 20 MIN: CPT
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
## 2025-07-14 ENCOUNTER — OFFICE VISIT (OUTPATIENT)
Dept: FAMILY MEDICINE CLINIC | Facility: CLINIC | Age: 75
End: 2025-07-14
Payer: MEDICARE

## 2025-07-14 VITALS
HEART RATE: 84 BPM | WEIGHT: 194.4 LBS | TEMPERATURE: 96.9 F | RESPIRATION RATE: 14 BRPM | OXYGEN SATURATION: 98 % | HEIGHT: 63 IN | BODY MASS INDEX: 34.45 KG/M2 | DIASTOLIC BLOOD PRESSURE: 62 MMHG | SYSTOLIC BLOOD PRESSURE: 128 MMHG

## 2025-07-14 DIAGNOSIS — Z12.11 ENCOUNTER FOR SCREENING COLONOSCOPY: ICD-10-CM

## 2025-07-14 DIAGNOSIS — R10.32 LLQ ABDOMINAL PAIN: Primary | ICD-10-CM

## 2025-07-14 DIAGNOSIS — K59.03 DRUG-INDUCED CONSTIPATION: ICD-10-CM

## 2025-07-14 DIAGNOSIS — G62.9 NEUROPATHY: ICD-10-CM

## 2025-07-14 DIAGNOSIS — E11.65 TYPE 2 DIABETES MELLITUS WITH HYPERGLYCEMIA, WITH LONG-TERM CURRENT USE OF INSULIN: ICD-10-CM

## 2025-07-14 DIAGNOSIS — Z79.4 TYPE 2 DIABETES MELLITUS WITH HYPERGLYCEMIA, WITH LONG-TERM CURRENT USE OF INSULIN: ICD-10-CM

## 2025-07-14 PROCEDURE — G2211 COMPLEX E/M VISIT ADD ON: HCPCS | Performed by: NURSE PRACTITIONER

## 2025-07-14 PROCEDURE — 1160F RVW MEDS BY RX/DR IN RCRD: CPT | Performed by: NURSE PRACTITIONER

## 2025-07-14 PROCEDURE — 1159F MED LIST DOCD IN RCRD: CPT | Performed by: NURSE PRACTITIONER

## 2025-07-14 PROCEDURE — 3051F HG A1C>EQUAL 7.0%<8.0%: CPT | Performed by: NURSE PRACTITIONER

## 2025-07-14 PROCEDURE — 1125F AMNT PAIN NOTED PAIN PRSNT: CPT | Performed by: NURSE PRACTITIONER

## 2025-07-14 PROCEDURE — 99214 OFFICE O/P EST MOD 30 MIN: CPT | Performed by: NURSE PRACTITIONER

## 2025-07-14 NOTE — PROGRESS NOTES
Date: 2025   Patient Name: Gera Dominguez  : 1950   MRN: [id number]     Chief Complaint:    Chief Complaint   Patient presents with    Pain     Left leg    Abdominal Pain     Lower abdomen with burning and bloating       History of Present Illness: Gera Dominguez is a 74 y.o. female who is here today to follow up for Pain  Symptoms: abdominal pain and numbness    Abdominal Pain       History of Present Illness  The patient presents for evaluation of nerve pain, constipation, and diabetes.    She has been on gabapentin 300 mg twice daily for approximately 9 months, which she finds effective. However, she expresses concern about potential side effects, particularly the risk of Alzheimer's and dementia in older adults, as she has read in various articles. She reports no family history of these conditions. She experiences nerve pain in her leg, which is manageable during the day but becomes severe at night, causing stiffness upon waking. The pain is described as a burning sensation that disrupts her sleep. She has previously consulted a pain management specialist and undergone physical therapy, both of which provided some relief. She also reports tingling sensations in her fingers and hands but does not believe she has neuropathy. She has undergone two major surgeries and prefers to avoid further surgical interventions unless absolutely necessary.    She reports no kidney stones but mentions an issue with her intestines, characterized by a burning sensation that starts three days before a bowel movement. This sensation affects her leg, making it feel heavy and impacting her mobility. She has sought emergency care for this issue on several occasions. She plans to undergo a colonoscopy and is considering alternatives to general anesthesia due to past difficulties with recovery. She continues to take Ozempic and occasionally experiences constipation. She has not been diagnosed with diverticulosis.  She has been experiencing these symptoms intermittently for the past 1.5 years, but they have recently become constant. She describes the pain as a burning sensation, like a poker in her stomach, which is currently affecting her walking due to bloating and pressure on her leg. She has had an abdominal scan and ultrasound, which did not reveal any abnormalities in the left lower quadrant. She was informed that she has an unusual anatomy.    She is currently on Ozempic for her diabetes.    She has not yet undergone a bone density test, despite having a referral.    PAST SURGICAL HISTORY:  Two major spine surgeries    FAMILY HISTORY  She reports no family history of Alzheimer's or dementia.         Review of Systems:   Review of Systems   Gastrointestinal:  Positive for abdominal pain.   Neurological:  Positive for numbness.       I have reviewed the patients family history, social history, past medical history, past surgical history and have updated it as appropriate.     Medications:     Current Outpatient Medications:     Blood Glucose Monitoring Suppl (OneTouch Verio Flex System) w/Device kit, , Disp: , Rfl:     gabapentin (NEURONTIN) 300 MG capsule, Take 1 capsule by mouth 2 (Two) Times a Day., Disp: 60 capsule, Rfl: 0    glucose blood test strip, Use as directed daily, Disp: 100 each, Rfl: 3    glucose monitor monitoring kit, Use to monitor BG up to 4 times daily, Disp: 1 each, Rfl: 0    ibuprofen (ADVIL,MOTRIN) 600 MG tablet, Take 1 tablet by mouth Every 6 (Six) Hours As Needed for Mild Pain., Disp: 20 tablet, Rfl: 0    insulin degludec (TRESIBA FLEXTOUCH) 100 UNIT/ML solution pen-injector injection, Inject 30u once daily; titrate for max daily dose of 50u, Disp: 15 mL, Rfl: 5    Insulin Pen Needle 32G X 4 MM misc, Use 1 each Daily., Disp: 100 each, Rfl: 3    Lancets misc, Use 1 each Daily., Disp: 100 each, Rfl: 3    meloxicam (MOBIC) 7.5 MG tablet, Take 1 tablet by mouth Daily., Disp: , Rfl:     mirtazapine  "(REMERON) 7.5 MG tablet, Take 1 tablet by mouth Every Night., Disp: 30 tablet, Rfl: 1    Semaglutide,0.25 or 0.5MG/DOS, (OZEMPIC) 2 MG/3ML solution pen-injector, Inject 0.5 mg under the skin into the appropriate area as directed 1 (One) Time Per Week., Disp: 3 mL, Rfl: 0    timolol (TIMOPTIC) 0.5 % ophthalmic solution, , Disp: , Rfl:     Allergies:   Allergies   Allergen Reactions    Statins Myalgia    Metformin Myalgia    Ace Inhibitors Unknown - Low Severity       PHQ-9 Total Score:      Physical Exam:  Vital Signs:   Vitals:    07/14/25 1429   BP: 128/62   Pulse: 84   Resp: 14   Temp: 96.9 °F (36.1 °C)   SpO2: 98%   Weight: 88.2 kg (194 lb 6.4 oz)   Height: 160 cm (62.99\")     Body mass index is 34.45 kg/m².           Physical Exam  Vitals and nursing note reviewed.   Constitutional:       Appearance: Normal appearance.   HENT:      Head: Normocephalic and atraumatic.   Cardiovascular:      Rate and Rhythm: Normal rate and regular rhythm.   Pulmonary:      Effort: Pulmonary effort is normal.      Breath sounds: Normal breath sounds.   Abdominal:      General: Bowel sounds are normal.      Tenderness: There is no abdominal tenderness.   Skin:     General: Skin is warm.   Neurological:      Mental Status: She is alert and oriented to person, place, and time.           Assessment/Plan:   Diagnoses and all orders for this visit:    1. LLQ abdominal pain (Primary)  -     Ambulatory Referral For Screening Colonoscopy    2. Encounter for screening colonoscopy  -     Ambulatory Referral For Screening Colonoscopy    3. Drug-induced constipation    4. Type 2 diabetes mellitus with hyperglycemia, with long-term current use of insulin    5. Neuropathy         Assessment & Plan  1. Nerve pain.  - Nerve pain is likely exacerbated by diabetes, which can cause nerve damage over time.  - Currently taking gabapentin 300 mg twice a day; no side effects reported.  - Concerns about potential links between gabapentin and Alzheimer's " disease were discussed; reassured due to no strong family history of dementia.  - Advised to continue current gabapentin regimen as discontinuing might lead to increased pain.    2. Constipation.  - Constipation could be a side effect of Ozempic, which slows down gastric emptying and can lead to constipation or diarrhea.  - Reported intermittent burning pain in the abdomen and bloating, affecting walking.  - Referral for a colonoscopy was made to further investigate symptoms.  - Advised to schedule the colonoscopy and follow up with the results.    3. Diabetes mellitus.  - Currently on Ozempic for diabetes management.  - Informed that Ozempic can cause gastrointestinal side effects, including constipation and diarrhea.  - Monitoring for any changes in symptoms related to diabetes management.    4. Abdominal pain and bloating.  - Reported persistent abdominal pain and bloating, particularly in the left lower quadrant.  - Previous scans and ultrasound in 09/2024 showed no significant findings except for gallbladder stones.  - Physical exam revealed good bowel sounds and no immediate concerns.  - Advised to proceed with the colonoscopy to rule out any underlying issues.    Patient or patient representative verbalized consent for the use of Ambient Listening during the visit with  ELLIS Lundberg for chart documentation. 7/14/2025  17:48 EDT      Follow Up:   Return for Next scheduled follow up.      Sonia Flynn. ELLIS   [facility]

## 2025-07-16 NOTE — PROGRESS NOTES
Chief complaint/Reason for consult: T2DM    HPI: Ms. Dominguez is a 74yoF who comes for follow-up of uncontrolled diabetes.  She was last seen 1/31/2025 and reports since that time breaking her left humerus falling out of a trolly.  It is healing with conservative management/nonoperative but she has a lot of pain still.      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  # Diabetic polyneuropathy   - Diagnosed: age 47   - Current regimen includes: Tresiba 35u daily and Ozempic 1.0 mg weekly  - No GI issues with Ozempic   - Compliance with medications is good   - She has history of gallstones based on ultrasound done 9/17/2024 but is asymptomatic and reports today no issues with Ozempic   - HbA1c: 7.4% done 6/24/2025   - Reports home FSBG checked a few times per week, typically in low to mid 100s   - Hypoglycemia does not occur that she is aware of   - Patient has symptoms with lows   - Hyperglycemia occurs rarely   - Patient reports neuropathy that is bothersome in her legs and hands she thinks due to her spine issues; reports Gabapentin helps some   - Patient denies gastroparesis   - Patient reports not rotating injection sites as much as she should, using right side of abdomen   - Patient without known ASCVD   - not taking ACEi/ARB; blood pressure today 126/64     DM Health Maintenance:  Ophtho: 3/2025, no known retinopathy, reports having possible glaucoma   Monofilament / Foot exam: 11/2024, no sores, declines exam today  Lipids/Statin: not taking a statin, due for FLP   CECILIA: 30 done 9/10/2024  TSH: 1.300 done 6/24/2025  Aspirin: not taking     Past medical history, past surgical history, family history and social history reviewed within this encounter.     Review of Systems   Constitutional:  Positive for fatigue. Negative for unexpected weight change.   HENT:  Negative for trouble swallowing.    Eyes:  Positive for pain and visual disturbance.   Respiratory:  Negative for shortness of breath.  "   Cardiovascular:  Negative for chest pain.   Gastrointestinal:  Negative for abdominal pain.   Endocrine: Negative for cold intolerance and heat intolerance.   Genitourinary:  Negative for dysuria.   Musculoskeletal:  Positive for arthralgias.   Skin:  Negative for rash.   Neurological:  Positive for numbness.   Psychiatric/Behavioral:  Negative for agitation.         /64 (BP Location: Right arm, Patient Position: Sitting, Cuff Size: Adult)   Pulse 81   Ht 160 cm (62.99\")   Wt 89.7 kg (197 lb 12.8 oz)   SpO2 95%   BMI 35.05 kg/m²      Physical Exam  Vitals reviewed.   Constitutional:       General: She is not in acute distress.     Appearance: She is obese.   HENT:      Head: Normocephalic.      Nose: Nose normal.   Eyes:      Conjunctiva/sclera: Conjunctivae normal.   Cardiovascular:      Rate and Rhythm: Normal rate.   Pulmonary:      Effort: Pulmonary effort is normal.   Abdominal:      Tenderness: There is no guarding.   Musculoskeletal:      Comments: Antalgic gait, using a cane to ambulate   Skin:     General: Skin is warm and dry.   Neurological:      Mental Status: She is alert and oriented to person, place, and time.   Psychiatric:         Mood and Affect: Mood normal.          Labs and images reviewed as noted in the HPI    Assessment and plan:    Diagnoses and all orders for this visit:    1. Type 2 diabetes mellitus with hyperglycemia, with long-term current use of insulin  Assessment & Plan:  -Diabetes is uncontrolled due to hyperglycemia but is improving  -Complications include known diabetic polyneuropathy complicated by neuropathy from prior spine issues  -Patient is at risk for complications due to persistent hyperglycemia; counseled that long term hyperglycemia can cause worsening neuropathy, kidney damage, eye damage and cardiovascular disease  -Recommend she go back to checking FSBG at least once daily  -Continue Tresiba 35 units once daily  -Continue Ozempic 1.0 mg weekly  -She is " aware of increased risk of gallbladder dysfunction with GLP-1 agonist along with other GI related side effects, she has known gallstones and is okay with the increased risk with taking a GLP-1 agonist  -Not taking ACEi/ARB; blood pressure today 126/64     DM Health Maintenance:  Ophtho: 3/2025, no known retinopathy, reports having possible glaucoma   Monofilament / Foot exam: 11/2024, no sores, declines exam today  Lipids/Statin: not taking a statin, due for FLP   CECILIA: 30 done 9/10/2024  TSH: 1.300 done 6/24/2025  Aspirin: not taking    Orders:  -     glucose blood test strip; Use as directed daily  Dispense: 100 each; Refill: 3  -     insulin degludec (TRESIBA FLEXTOUCH) 100 UNIT/ML solution pen-injector injection; Inject 35u once daily; titrate for max daily dose of 50u  Dispense: 15 mL; Refill: 5  -     Insulin Pen Needle 32G X 4 MM misc; Use 1 each Daily.  Dispense: 100 each; Refill: 3  -     Lancets misc; Use 1 each Daily.  Dispense: 100 each; Refill: 3  -     Semaglutide, 1 MG/DOSE, (Ozempic, 1 MG/DOSE,) 4 MG/3ML solution pen-injector; Inject 1 mg under the skin into the appropriate area as directed 1 (One) Time Per Week.  Dispense: 9 mL; Refill: 1       Fasting labs due at follow-up  Return in about 3 months (around 10/17/2025) for T2DM.     Please note that portions of this note may have been completed with a voice recognition program. Efforts were made to edit the dictations, but occasionally words are mistranscribed.     Electronically signed by: Kyle S Rosenstein, MD

## 2025-07-17 ENCOUNTER — OFFICE VISIT (OUTPATIENT)
Dept: ENDOCRINOLOGY | Facility: CLINIC | Age: 75
End: 2025-07-17
Payer: MEDICARE

## 2025-07-17 VITALS
DIASTOLIC BLOOD PRESSURE: 64 MMHG | BODY MASS INDEX: 35.05 KG/M2 | HEIGHT: 63 IN | SYSTOLIC BLOOD PRESSURE: 126 MMHG | WEIGHT: 197.8 LBS | OXYGEN SATURATION: 95 % | HEART RATE: 81 BPM

## 2025-07-17 DIAGNOSIS — E11.65 TYPE 2 DIABETES MELLITUS WITH HYPERGLYCEMIA, WITH LONG-TERM CURRENT USE OF INSULIN: ICD-10-CM

## 2025-07-17 DIAGNOSIS — Z79.4 TYPE 2 DIABETES MELLITUS WITH HYPERGLYCEMIA, WITH LONG-TERM CURRENT USE OF INSULIN: ICD-10-CM

## 2025-07-17 RX ORDER — AVOBENZONE, HOMOSALATE, OCTISALATE, OCTOCRYLENE 30; 40; 45; 26 MG/ML; MG/ML; MG/ML; MG/ML
1 CREAM TOPICAL DAILY
Qty: 100 EACH | Refills: 3 | Status: SHIPPED | OUTPATIENT
Start: 2025-07-17

## 2025-07-17 RX ORDER — SEMAGLUTIDE 1.34 MG/ML
1 INJECTION, SOLUTION SUBCUTANEOUS WEEKLY
Qty: 9 ML | Refills: 1 | Status: SHIPPED | OUTPATIENT
Start: 2025-07-17

## 2025-07-17 NOTE — ASSESSMENT & PLAN NOTE
-Diabetes is uncontrolled due to hyperglycemia but is improving  -Complications include known diabetic polyneuropathy complicated by neuropathy from prior spine issues  -Patient is at risk for complications due to persistent hyperglycemia; counseled that long term hyperglycemia can cause worsening neuropathy, kidney damage, eye damage and cardiovascular disease  -Recommend she go back to checking FSBG at least once daily  -Continue Tresiba 35 units once daily  -Continue Ozempic 1.0 mg weekly  -She is aware of increased risk of gallbladder dysfunction with GLP-1 agonist along with other GI related side effects, she has known gallstones and is okay with the increased risk with taking a GLP-1 agonist  -Not taking ACEi/ARB; blood pressure today 126/64     DM Health Maintenance:  Ophtho: 3/2025, no known retinopathy, reports having possible glaucoma   Monofilament / Foot exam: 11/2024, no sores, declines exam today  Lipids/Statin: not taking a statin, due for FLP   CECILIA: 30 done 9/10/2024  TSH: 1.300 done 6/24/2025  Aspirin: not taking

## 2025-07-18 DIAGNOSIS — M96.1 POSTLAMINECTOMY SYNDROME: ICD-10-CM

## 2025-07-18 RX ORDER — GABAPENTIN 300 MG/1
300 CAPSULE ORAL 2 TIMES DAILY
Qty: 60 CAPSULE | Refills: 1 | Status: SHIPPED | OUTPATIENT
Start: 2025-07-18

## 2025-07-21 RX ORDER — SODIUM, POTASSIUM,MAG SULFATES 17.5-3.13G
SOLUTION, RECONSTITUTED, ORAL ORAL
Qty: 354 ML | Refills: 0 | Status: SHIPPED | OUTPATIENT
Start: 2025-07-21

## 2025-07-25 ENCOUNTER — TELEPHONE (OUTPATIENT)
Dept: ENDOCRINOLOGY | Facility: CLINIC | Age: 75
End: 2025-07-25
Payer: MEDICARE

## 2025-07-25 NOTE — TELEPHONE ENCOUNTER
Caller: Gera Dominguez    Relationship to patient: Self    Best call back number: [phone]    Patient is needing: STATED SHE SAW PRESCRIPTIONS WERE CALLED INTO A PHARMACY BUT SHE GETS THEM AT THE OFFICE AND WONDERING IF IT WAS A MISTAKE SHE DOES NOT NEED ANY REFILLS YET

## 2025-07-25 NOTE — TELEPHONE ENCOUNTER
SPOKE WITH PATIENT AND ADVISED TO DISREGARD SINCE SHE GETS HER RX'S VIA [employer]. SHE VOICED UNDERSTANDING.

## 2025-07-30 ENCOUNTER — OUTSIDE FACILITY SERVICE (OUTPATIENT)
Dept: GASTROENTEROLOGY | Facility: CLINIC | Age: 75
End: 2025-07-30
Payer: MEDICARE

## 2025-07-30 PROCEDURE — 45385 COLONOSCOPY W/LESION REMOVAL: CPT | Performed by: INTERNAL MEDICINE

## 2025-08-01 ENCOUNTER — RESULTS FOLLOW-UP (OUTPATIENT)
Dept: GASTROENTEROLOGY | Facility: CLINIC | Age: 75
End: 2025-08-01
Payer: MEDICARE

## 2025-08-01 NOTE — LETTER
August 4, 2025    Gera Dominguez  [address] [address]            Dear Ms. Dominguez:  This letter is to review the biopsy report from your July 30, 2025 colonoscopy.    The polyp removed from the transverse colon proved to be a tubular adenoma.  Adenomatous polyps are benign polyps that have the potential for becoming malignant.  Their removal decreases your risk for developing colon cancer.    As you know, your bowel prep was not adequate enough to rule out any polyps or other abnormalities in the right colon and therefore we have asked you return at a later date after a 2-day bowel prep so that we can visualize this area in the colon and make sure we are not missing any polyps or malignancy in this area.    I hope this letter finds you well.  I encourage you to call with any questions or concerns you may have.    Sincerely,  Azar Williamson MD    CC: ELLIS Guallpa

## 2025-08-01 NOTE — TELEPHONE ENCOUNTER
2025    Gera Dominguez  [address] [place] [address]    : 1950      Dear Ms. Dominguez:  This letter is to review the biopsy report from your 2025 colonoscopy.    The polyp removed from the transverse colon proved to be a tubular adenoma.  Adenomatous polyps are benign polyps that have the potential for becoming malignant.  There removal decreases your risk for developing colon cancer.    As you know, your bowel prep was not adequate enough to rule out any polyps or other abnormalities in the right colon and therefore we have asked you return at a later date after a 2-day bowel prep so that we can visualize this area in the colon and make sure we are not missing any polyps or malignancy in this area.    I hope this letter finds you well.  I encourage you to call with any questions or concerns you may have.    Sincerely,  Azar Williamson MD    CC: ELLIS Guallpa

## 2025-08-02 DIAGNOSIS — M19.90 ARTHRITIS: Primary | ICD-10-CM

## 2025-08-04 RX ORDER — MELOXICAM 7.5 MG/1
7.5 TABLET ORAL DAILY
Qty: 90 TABLET | Refills: 1 | Status: SHIPPED | OUTPATIENT
Start: 2025-08-04

## 2025-08-12 ENCOUNTER — OFFICE VISIT (OUTPATIENT)
Age: 75
End: 2025-08-12
Payer: MEDICARE

## 2025-08-12 VITALS
WEIGHT: 197 LBS | SYSTOLIC BLOOD PRESSURE: 124 MMHG | BODY MASS INDEX: 34.91 KG/M2 | HEIGHT: 63 IN | DIASTOLIC BLOOD PRESSURE: 82 MMHG

## 2025-08-12 DIAGNOSIS — Z09 FRACTURE FOLLOW-UP: Primary | ICD-10-CM

## 2025-08-12 DIAGNOSIS — S42.292D CLOSED 3-PART FRACTURE OF PROXIMAL END OF LEFT HUMERUS WITH ROUTINE HEALING, SUBSEQUENT ENCOUNTER: ICD-10-CM
